# Patient Record
Sex: FEMALE | Race: WHITE | NOT HISPANIC OR LATINO | Employment: OTHER | URBAN - METROPOLITAN AREA
[De-identification: names, ages, dates, MRNs, and addresses within clinical notes are randomized per-mention and may not be internally consistent; named-entity substitution may affect disease eponyms.]

---

## 2017-01-19 ENCOUNTER — APPOINTMENT (OUTPATIENT)
Dept: LAB | Facility: HOSPITAL | Age: 82
End: 2017-01-19
Payer: MEDICARE

## 2017-01-19 ENCOUNTER — ALLSCRIPTS OFFICE VISIT (OUTPATIENT)
Dept: FAMILY MEDICINE CLINIC | Facility: CLINIC | Age: 82
End: 2017-01-19
Payer: MEDICARE

## 2017-01-19 DIAGNOSIS — I10 ESSENTIAL (PRIMARY) HYPERTENSION: ICD-10-CM

## 2017-01-19 LAB
ALBUMIN SERPL BCP-MCNC: 3.9 G/DL (ref 3.5–5)
ALP SERPL-CCNC: 69 U/L (ref 46–116)
ALT SERPL W P-5'-P-CCNC: 24 U/L (ref 12–78)
ANION GAP SERPL CALCULATED.3IONS-SCNC: 8 MMOL/L (ref 4–13)
AST SERPL W P-5'-P-CCNC: 28 U/L (ref 5–45)
BACTERIA UR QL AUTO: NORMAL /HPF
BASOPHILS # BLD AUTO: 0.03 THOUSANDS/ΜL (ref 0–0.1)
BASOPHILS NFR BLD AUTO: 1 % (ref 0–1)
BILIRUB SERPL-MCNC: 0.48 MG/DL (ref 0.2–1)
BILIRUB UR QL STRIP: ABNORMAL
BUN SERPL-MCNC: 20 MG/DL (ref 5–25)
CALCIUM SERPL-MCNC: 9.3 MG/DL (ref 8.3–10.1)
CHLORIDE SERPL-SCNC: 104 MMOL/L (ref 100–108)
CHOLEST SERPL-MCNC: 197 MG/DL (ref 50–200)
CLARITY UR: CLEAR
CO2 SERPL-SCNC: 28 MMOL/L (ref 21–32)
COLOR UR: ABNORMAL
CREAT SERPL-MCNC: 0.89 MG/DL (ref 0.6–1.3)
EOSINOPHIL # BLD AUTO: 0.5 THOUSAND/ΜL (ref 0–0.61)
EOSINOPHIL NFR BLD AUTO: 9 % (ref 0–6)
ERYTHROCYTE [DISTWIDTH] IN BLOOD BY AUTOMATED COUNT: 13.2 % (ref 11.6–15.1)
GFR SERPL CREATININE-BSD FRML MDRD: >60 ML/MIN/1.73SQ M
GLUCOSE SERPL-MCNC: 92 MG/DL (ref 65–140)
GLUCOSE UR STRIP-MCNC: NEGATIVE MG/DL
HCT VFR BLD AUTO: 38.2 % (ref 34.8–46.1)
HDLC SERPL-MCNC: 72 MG/DL (ref 40–60)
HGB BLD-MCNC: 12.7 G/DL (ref 11.5–15.4)
HGB UR QL STRIP.AUTO: ABNORMAL
KETONES UR STRIP-MCNC: ABNORMAL MG/DL
LDLC SERPL CALC-MCNC: 112 MG/DL (ref 0–100)
LEUKOCYTE ESTERASE UR QL STRIP: NEGATIVE
LYMPHOCYTES # BLD AUTO: 1.36 THOUSANDS/ΜL (ref 0.6–4.47)
LYMPHOCYTES NFR BLD AUTO: 24 % (ref 14–44)
MCH RBC QN AUTO: 29.7 PG (ref 26.8–34.3)
MCHC RBC AUTO-ENTMCNC: 33.2 G/DL (ref 31.4–37.4)
MCV RBC AUTO: 89 FL (ref 82–98)
MONOCYTES # BLD AUTO: 0.4 THOUSAND/ΜL (ref 0.17–1.22)
MONOCYTES NFR BLD AUTO: 7 % (ref 4–12)
NEUTROPHILS # BLD AUTO: 3.27 THOUSANDS/ΜL (ref 1.85–7.62)
NEUTS SEG NFR BLD AUTO: 59 % (ref 43–75)
NITRITE UR QL STRIP: NEGATIVE
NON-SQ EPI CELLS URNS QL MICRO: NORMAL /HPF
NRBC BLD AUTO-RTO: 0 /100 WBCS
PH UR STRIP.AUTO: 6.5 [PH] (ref 4.5–8)
PLATELET # BLD AUTO: 262 THOUSANDS/UL (ref 149–390)
PMV BLD AUTO: 10.6 FL (ref 8.9–12.7)
POTASSIUM SERPL-SCNC: 3.7 MMOL/L (ref 3.5–5.3)
PROT SERPL-MCNC: 7.7 G/DL (ref 6.4–8.2)
PROT UR STRIP-MCNC: ABNORMAL MG/DL
RBC # BLD AUTO: 4.28 MILLION/UL (ref 3.81–5.12)
RBC #/AREA URNS AUTO: NORMAL /HPF
SODIUM SERPL-SCNC: 140 MMOL/L (ref 136–145)
SP GR UR STRIP.AUTO: 1.02 (ref 1–1.03)
T3 SERPL-MCNC: 0.9 NG/ML (ref 0.6–1.8)
T4 FREE SERPL-MCNC: 1.38 NG/DL (ref 0.76–1.46)
TRIGL SERPL-MCNC: 65 MG/DL
TSH SERPL DL<=0.05 MIU/L-ACNC: 0.93 UIU/ML (ref 0.36–3.74)
UROBILINOGEN UR QL STRIP.AUTO: 0.2 E.U./DL
WBC # BLD AUTO: 5.57 THOUSAND/UL (ref 4.31–10.16)
WBC #/AREA URNS AUTO: NORMAL /HPF

## 2017-01-19 PROCEDURE — 99213 OFFICE O/P EST LOW 20 MIN: CPT | Performed by: PHYSICIAN ASSISTANT

## 2017-01-19 PROCEDURE — 84480 ASSAY TRIIODOTHYRONINE (T3): CPT

## 2017-01-19 PROCEDURE — 84443 ASSAY THYROID STIM HORMONE: CPT

## 2017-01-19 PROCEDURE — 87086 URINE CULTURE/COLONY COUNT: CPT

## 2017-01-19 PROCEDURE — 85025 COMPLETE CBC W/AUTO DIFF WBC: CPT

## 2017-01-19 PROCEDURE — 81001 URINALYSIS AUTO W/SCOPE: CPT

## 2017-01-19 PROCEDURE — 80061 LIPID PANEL: CPT

## 2017-01-19 PROCEDURE — 36415 COLL VENOUS BLD VENIPUNCTURE: CPT

## 2017-01-19 PROCEDURE — 84439 ASSAY OF FREE THYROXINE: CPT

## 2017-01-19 PROCEDURE — 80053 COMPREHEN METABOLIC PANEL: CPT

## 2017-01-20 LAB — BACTERIA UR CULT: NORMAL

## 2017-03-04 ENCOUNTER — APPOINTMENT (EMERGENCY)
Dept: RADIOLOGY | Facility: HOSPITAL | Age: 82
End: 2017-03-04
Payer: MEDICARE

## 2017-03-04 ENCOUNTER — HOSPITAL ENCOUNTER (EMERGENCY)
Facility: HOSPITAL | Age: 82
Discharge: HOME | End: 2017-03-04
Payer: MEDICARE

## 2017-03-04 ENCOUNTER — APPOINTMENT (EMERGENCY)
Dept: CT IMAGING | Facility: HOSPITAL | Age: 82
End: 2017-03-04
Payer: MEDICARE

## 2017-03-04 VITALS
HEART RATE: 83 BPM | TEMPERATURE: 98.3 F | WEIGHT: 99 LBS | HEIGHT: 55 IN | BODY MASS INDEX: 22.91 KG/M2 | OXYGEN SATURATION: 98 % | DIASTOLIC BLOOD PRESSURE: 79 MMHG | SYSTOLIC BLOOD PRESSURE: 117 MMHG | RESPIRATION RATE: 16 BRPM

## 2017-03-04 DIAGNOSIS — E86.0 DEHYDRATION: ICD-10-CM

## 2017-03-04 DIAGNOSIS — R55 NEAR SYNCOPE: Primary | ICD-10-CM

## 2017-03-04 LAB
ALBUMIN SERPL BCP-MCNC: 3.6 G/DL (ref 3.5–5)
ALP SERPL-CCNC: 78 U/L (ref 46–116)
ALT SERPL W P-5'-P-CCNC: 24 U/L (ref 12–78)
ANION GAP SERPL CALCULATED.3IONS-SCNC: 13 MMOL/L (ref 4–13)
AST SERPL W P-5'-P-CCNC: 24 U/L (ref 5–45)
BACTERIA UR QL AUTO: ABNORMAL /HPF
BASOPHILS # BLD AUTO: 0.02 THOUSANDS/ΜL (ref 0–0.1)
BASOPHILS NFR BLD AUTO: 0 % (ref 0–1)
BILIRUB SERPL-MCNC: 0.5 MG/DL (ref 0.2–1)
BILIRUB UR QL STRIP: NEGATIVE
BUN SERPL-MCNC: 25 MG/DL (ref 5–25)
CALCIUM SERPL-MCNC: 9.4 MG/DL (ref 8.3–10.1)
CHLORIDE SERPL-SCNC: 95 MMOL/L (ref 100–108)
CLARITY UR: CLEAR
CO2 SERPL-SCNC: 26 MMOL/L (ref 21–32)
COLOR UR: ABNORMAL
CREAT SERPL-MCNC: 1.4 MG/DL (ref 0.6–1.3)
EOSINOPHIL # BLD AUTO: 0.18 THOUSAND/ΜL (ref 0–0.61)
EOSINOPHIL NFR BLD AUTO: 1 % (ref 0–6)
ERYTHROCYTE [DISTWIDTH] IN BLOOD BY AUTOMATED COUNT: 11.8 % (ref 11.6–15.1)
GFR SERPL CREATININE-BSD FRML MDRD: 35.8 ML/MIN/1.73SQ M
GLUCOSE SERPL-MCNC: 120 MG/DL (ref 65–140)
GLUCOSE UR STRIP-MCNC: NEGATIVE MG/DL
HCT VFR BLD AUTO: 39.1 % (ref 34.8–46.1)
HGB BLD-MCNC: 12.8 G/DL (ref 11.5–15.4)
HGB UR QL STRIP.AUTO: ABNORMAL
HOLD SPECIMEN: NORMAL
KETONES UR STRIP-MCNC: NEGATIVE MG/DL
LEUKOCYTE ESTERASE UR QL STRIP: NEGATIVE
LYMPHOCYTES # BLD AUTO: 2.09 THOUSANDS/ΜL (ref 0.6–4.47)
LYMPHOCYTES NFR BLD AUTO: 16 % (ref 14–44)
MCH RBC QN AUTO: 29.2 PG (ref 26.8–34.3)
MCHC RBC AUTO-ENTMCNC: 32.7 G/DL (ref 31.4–37.4)
MCV RBC AUTO: 89 FL (ref 82–98)
MONOCYTES # BLD AUTO: 1.52 THOUSAND/ΜL (ref 0.17–1.22)
MONOCYTES NFR BLD AUTO: 11 % (ref 4–12)
NEUTROPHILS # BLD AUTO: 9.57 THOUSANDS/ΜL (ref 1.85–7.62)
NEUTS SEG NFR BLD AUTO: 72 % (ref 43–75)
NITRITE UR QL STRIP: NEGATIVE
NON-SQ EPI CELLS URNS QL MICRO: ABNORMAL /HPF
PH UR STRIP.AUTO: 7 [PH] (ref 4.5–8)
PLATELET # BLD AUTO: 344 THOUSANDS/UL (ref 149–390)
PMV BLD AUTO: 9.7 FL (ref 8.9–12.7)
POTASSIUM SERPL-SCNC: 3.6 MMOL/L (ref 3.5–5.3)
PROT SERPL-MCNC: 8.4 G/DL (ref 6.4–8.2)
PROT UR STRIP-MCNC: NEGATIVE MG/DL
RBC # BLD AUTO: 4.39 MILLION/UL (ref 3.81–5.12)
RBC #/AREA URNS AUTO: ABNORMAL /HPF
SODIUM SERPL-SCNC: 134 MMOL/L (ref 136–145)
SP GR UR STRIP.AUTO: 1.01 (ref 1–1.03)
TROPONIN I SERPL-MCNC: <0.02 NG/ML
UROBILINOGEN UR QL STRIP.AUTO: 0.2 E.U./DL
WBC # BLD AUTO: 13.38 THOUSAND/UL (ref 4.31–10.16)
WBC #/AREA URNS AUTO: ABNORMAL /HPF

## 2017-03-04 PROCEDURE — 85025 COMPLETE CBC W/AUTO DIFF WBC: CPT | Performed by: PHYSICIAN ASSISTANT

## 2017-03-04 PROCEDURE — 81001 URINALYSIS AUTO W/SCOPE: CPT | Performed by: PHYSICIAN ASSISTANT

## 2017-03-04 PROCEDURE — 71020 HB CHEST X-RAY 2VW FRONTAL&LATL: CPT

## 2017-03-04 PROCEDURE — 70450 CT HEAD/BRAIN W/O DYE: CPT

## 2017-03-04 PROCEDURE — 87077 CULTURE AEROBIC IDENTIFY: CPT | Performed by: PHYSICIAN ASSISTANT

## 2017-03-04 PROCEDURE — 80053 COMPREHEN METABOLIC PANEL: CPT | Performed by: PHYSICIAN ASSISTANT

## 2017-03-04 PROCEDURE — 93005 ELECTROCARDIOGRAM TRACING: CPT | Performed by: PHYSICIAN ASSISTANT

## 2017-03-04 PROCEDURE — 96361 HYDRATE IV INFUSION ADD-ON: CPT

## 2017-03-04 PROCEDURE — 96360 HYDRATION IV INFUSION INIT: CPT

## 2017-03-04 PROCEDURE — 87086 URINE CULTURE/COLONY COUNT: CPT | Performed by: PHYSICIAN ASSISTANT

## 2017-03-04 PROCEDURE — 99285 EMERGENCY DEPT VISIT HI MDM: CPT

## 2017-03-04 PROCEDURE — 87186 SC STD MICRODIL/AGAR DIL: CPT | Performed by: PHYSICIAN ASSISTANT

## 2017-03-04 PROCEDURE — 36415 COLL VENOUS BLD VENIPUNCTURE: CPT | Performed by: PHYSICIAN ASSISTANT

## 2017-03-04 PROCEDURE — 84484 ASSAY OF TROPONIN QUANT: CPT | Performed by: PHYSICIAN ASSISTANT

## 2017-03-04 RX ORDER — HYDROXYZINE HYDROCHLORIDE 10 MG/1
10 TABLET, FILM COATED ORAL 3 TIMES DAILY PRN
COMMUNITY
End: 2018-03-12

## 2017-03-04 RX ADMIN — SODIUM CHLORIDE 500 ML: 0.9 INJECTION, SOLUTION INTRAVENOUS at 12:44

## 2017-03-06 LAB
ATRIAL RATE: 90 BPM
BACTERIA UR CULT: NORMAL
P AXIS: 37 DEGREES
PR INTERVAL: 132 MS
QRS AXIS: -7 DEGREES
QRSD INTERVAL: 66 MS
QT INTERVAL: 354 MS
QTC INTERVAL: 433 MS
T WAVE AXIS: 49 DEGREES
VENTRICULAR RATE: 90 BPM

## 2017-03-22 ENCOUNTER — ALLSCRIPTS OFFICE VISIT (OUTPATIENT)
Dept: FAMILY MEDICINE CLINIC | Facility: CLINIC | Age: 82
End: 2017-03-22
Payer: MEDICARE

## 2017-03-22 DIAGNOSIS — R53.83 OTHER FATIGUE: ICD-10-CM

## 2017-03-22 DIAGNOSIS — R35.0 FREQUENCY OF MICTURITION: ICD-10-CM

## 2017-03-22 PROCEDURE — 99213 OFFICE O/P EST LOW 20 MIN: CPT | Performed by: PHYSICIAN ASSISTANT

## 2017-03-23 ENCOUNTER — APPOINTMENT (OUTPATIENT)
Dept: LAB | Facility: HOSPITAL | Age: 82
End: 2017-03-23
Payer: MEDICARE

## 2017-03-23 DIAGNOSIS — R35.0 FREQUENCY OF MICTURITION: ICD-10-CM

## 2017-03-23 DIAGNOSIS — R53.83 OTHER FATIGUE: ICD-10-CM

## 2017-03-23 LAB
BACTERIA UR QL AUTO: ABNORMAL /HPF
BASOPHILS # BLD AUTO: 0.01 THOUSANDS/ΜL (ref 0–0.1)
BASOPHILS NFR BLD AUTO: 0 % (ref 0–1)
BILIRUB UR QL STRIP: NEGATIVE
CLARITY UR: ABNORMAL
COLOR UR: YELLOW
EOSINOPHIL # BLD AUTO: 0.32 THOUSAND/ΜL (ref 0–0.61)
EOSINOPHIL NFR BLD AUTO: 5 % (ref 0–6)
ERYTHROCYTE [DISTWIDTH] IN BLOOD BY AUTOMATED COUNT: 12.6 % (ref 11.6–15.1)
GLUCOSE UR STRIP-MCNC: NEGATIVE MG/DL
HCT VFR BLD AUTO: 35 % (ref 34.8–46.1)
HGB BLD-MCNC: 11.5 G/DL (ref 11.5–15.4)
HGB UR QL STRIP.AUTO: NEGATIVE
HYALINE CASTS #/AREA URNS LPF: ABNORMAL /LPF
KETONES UR STRIP-MCNC: ABNORMAL MG/DL
LEUKOCYTE ESTERASE UR QL STRIP: ABNORMAL
LYMPHOCYTES # BLD AUTO: 1.34 THOUSANDS/ΜL (ref 0.6–4.47)
LYMPHOCYTES NFR BLD AUTO: 21 % (ref 14–44)
MCH RBC QN AUTO: 29 PG (ref 26.8–34.3)
MCHC RBC AUTO-ENTMCNC: 32.9 G/DL (ref 31.4–37.4)
MCV RBC AUTO: 88 FL (ref 82–98)
MONOCYTES # BLD AUTO: 0.49 THOUSAND/ΜL (ref 0.17–1.22)
MONOCYTES NFR BLD AUTO: 8 % (ref 4–12)
NEUTROPHILS # BLD AUTO: 4.3 THOUSANDS/ΜL (ref 1.85–7.62)
NEUTS SEG NFR BLD AUTO: 66 % (ref 43–75)
NITRITE UR QL STRIP: NEGATIVE
NON-SQ EPI CELLS URNS QL MICRO: ABNORMAL /HPF
NRBC BLD AUTO-RTO: 0 /100 WBCS
PH UR STRIP.AUTO: 8 [PH] (ref 4.5–8)
PLATELET # BLD AUTO: 281 THOUSANDS/UL (ref 149–390)
PMV BLD AUTO: 11 FL (ref 8.9–12.7)
PROT UR STRIP-MCNC: ABNORMAL MG/DL
RBC # BLD AUTO: 3.96 MILLION/UL (ref 3.81–5.12)
RBC #/AREA URNS AUTO: ABNORMAL /HPF
SP GR UR STRIP.AUTO: 1.02 (ref 1–1.03)
UROBILINOGEN UR QL STRIP.AUTO: 1 E.U./DL
WBC # BLD AUTO: 6.47 THOUSAND/UL (ref 4.31–10.16)
WBC #/AREA URNS AUTO: ABNORMAL /HPF

## 2017-03-23 PROCEDURE — 87086 URINE CULTURE/COLONY COUNT: CPT

## 2017-03-23 PROCEDURE — 85025 COMPLETE CBC W/AUTO DIFF WBC: CPT

## 2017-03-23 PROCEDURE — 81001 URINALYSIS AUTO W/SCOPE: CPT

## 2017-03-23 PROCEDURE — 36415 COLL VENOUS BLD VENIPUNCTURE: CPT

## 2017-03-24 LAB — BACTERIA UR CULT: NORMAL

## 2017-04-24 ENCOUNTER — GENERIC CONVERSION - ENCOUNTER (OUTPATIENT)
Dept: OTHER | Facility: OTHER | Age: 82
End: 2017-04-24

## 2017-04-24 ENCOUNTER — TRANSCRIBE ORDERS (OUTPATIENT)
Dept: ADMINISTRATIVE | Facility: HOSPITAL | Age: 82
End: 2017-04-24

## 2017-04-24 DIAGNOSIS — E04.0 GOITER, SPECIFIED AS SIMPLE: Primary | ICD-10-CM

## 2017-04-26 ENCOUNTER — ALLSCRIPTS OFFICE VISIT (OUTPATIENT)
Dept: FAMILY MEDICINE CLINIC | Facility: CLINIC | Age: 82
End: 2017-04-26
Payer: MEDICARE

## 2017-04-26 DIAGNOSIS — D64.9 ANEMIA: ICD-10-CM

## 2017-04-26 PROCEDURE — 99213 OFFICE O/P EST LOW 20 MIN: CPT | Performed by: FAMILY MEDICINE

## 2017-04-28 ENCOUNTER — TRANSCRIBE ORDERS (OUTPATIENT)
Dept: ADMINISTRATIVE | Facility: HOSPITAL | Age: 82
End: 2017-04-28

## 2017-04-28 ENCOUNTER — APPOINTMENT (OUTPATIENT)
Dept: LAB | Facility: HOSPITAL | Age: 82
End: 2017-04-28
Payer: MEDICARE

## 2017-04-28 ENCOUNTER — HOSPITAL ENCOUNTER (OUTPATIENT)
Dept: ULTRASOUND IMAGING | Facility: HOSPITAL | Age: 82
Discharge: HOME | End: 2017-04-28
Payer: MEDICARE

## 2017-04-28 DIAGNOSIS — D64.9 ANEMIA: ICD-10-CM

## 2017-04-28 DIAGNOSIS — E04.0 GOITER, SPECIFIED AS SIMPLE: ICD-10-CM

## 2017-04-28 LAB
BASOPHILS # BLD AUTO: 0.02 THOUSANDS/ΜL (ref 0–0.1)
BASOPHILS NFR BLD AUTO: 0 % (ref 0–1)
EOSINOPHIL # BLD AUTO: 0.28 THOUSAND/ΜL (ref 0–0.61)
EOSINOPHIL NFR BLD AUTO: 5 % (ref 0–6)
ERYTHROCYTE [DISTWIDTH] IN BLOOD BY AUTOMATED COUNT: 13 % (ref 11.6–15.1)
HCT VFR BLD AUTO: 34.1 % (ref 34.8–46.1)
HGB BLD-MCNC: 11.1 G/DL (ref 11.5–15.4)
LYMPHOCYTES # BLD AUTO: 1.62 THOUSANDS/ΜL (ref 0.6–4.47)
LYMPHOCYTES NFR BLD AUTO: 27 % (ref 14–44)
MCH RBC QN AUTO: 29.6 PG (ref 26.8–34.3)
MCHC RBC AUTO-ENTMCNC: 32.6 G/DL (ref 31.4–37.4)
MCV RBC AUTO: 91 FL (ref 82–98)
MONOCYTES # BLD AUTO: 0.49 THOUSAND/ΜL (ref 0.17–1.22)
MONOCYTES NFR BLD AUTO: 8 % (ref 4–12)
NEUTROPHILS # BLD AUTO: 3.6 THOUSANDS/ΜL (ref 1.85–7.62)
NEUTS SEG NFR BLD AUTO: 60 % (ref 43–75)
PLATELET # BLD AUTO: 246 THOUSANDS/UL (ref 149–390)
PMV BLD AUTO: 9.5 FL (ref 8.9–12.7)
RBC # BLD AUTO: 3.75 MILLION/UL (ref 3.81–5.12)
WBC # BLD AUTO: 6.01 THOUSAND/UL (ref 4.31–10.16)

## 2017-04-28 PROCEDURE — 36415 COLL VENOUS BLD VENIPUNCTURE: CPT

## 2017-04-28 PROCEDURE — 85025 COMPLETE CBC W/AUTO DIFF WBC: CPT

## 2017-04-28 PROCEDURE — 76536 US EXAM OF HEAD AND NECK: CPT

## 2017-05-04 ENCOUNTER — GENERIC CONVERSION - ENCOUNTER (OUTPATIENT)
Dept: OTHER | Facility: OTHER | Age: 82
End: 2017-05-04

## 2017-06-22 ENCOUNTER — GENERIC CONVERSION - ENCOUNTER (OUTPATIENT)
Dept: OTHER | Facility: OTHER | Age: 82
End: 2017-06-22

## 2017-06-22 ENCOUNTER — TRANSCRIBE ORDERS (OUTPATIENT)
Dept: LAB | Facility: MEDICAL CENTER | Age: 82
End: 2017-06-22

## 2017-06-22 ENCOUNTER — APPOINTMENT (OUTPATIENT)
Dept: FAMILY MEDICINE CLINIC | Facility: CLINIC | Age: 82
End: 2017-06-22
Payer: MEDICARE

## 2017-06-22 ENCOUNTER — APPOINTMENT (OUTPATIENT)
Dept: LAB | Facility: MEDICAL CENTER | Age: 82
End: 2017-06-22
Payer: MEDICARE

## 2017-06-22 DIAGNOSIS — C34.90 MALIGNANT NEOPLASM OF UNSPECIFIED PART OF UNSPECIFIED BRONCHUS OR LUNG (HCC): ICD-10-CM

## 2017-06-22 DIAGNOSIS — R53.83 OTHER FATIGUE: ICD-10-CM

## 2017-06-22 LAB
ALBUMIN SERPL BCP-MCNC: 3.9 G/DL (ref 3.5–5)
ALP SERPL-CCNC: 59 U/L (ref 46–116)
ALT SERPL W P-5'-P-CCNC: 25 U/L (ref 12–78)
ANION GAP SERPL CALCULATED.3IONS-SCNC: 8 MMOL/L (ref 4–13)
AST SERPL W P-5'-P-CCNC: 29 U/L (ref 5–45)
BASOPHILS # BLD AUTO: 0.01 THOUSANDS/ΜL (ref 0–0.1)
BASOPHILS NFR BLD AUTO: 0 % (ref 0–1)
BILIRUB SERPL-MCNC: 0.4 MG/DL (ref 0.2–1)
BILIRUB UR QL STRIP: NEGATIVE
BUN SERPL-MCNC: 29 MG/DL (ref 5–25)
CALCIUM SERPL-MCNC: 8.9 MG/DL (ref 8.3–10.1)
CHLORIDE SERPL-SCNC: 98 MMOL/L (ref 100–108)
CLARITY UR: CLEAR
CO2 SERPL-SCNC: 26 MMOL/L (ref 21–32)
COLOR UR: YELLOW
CREAT SERPL-MCNC: 1.22 MG/DL (ref 0.6–1.3)
EOSINOPHIL # BLD AUTO: 0.22 THOUSAND/ΜL (ref 0–0.61)
EOSINOPHIL NFR BLD AUTO: 4 % (ref 0–6)
ERYTHROCYTE [DISTWIDTH] IN BLOOD BY AUTOMATED COUNT: 12.8 % (ref 11.6–15.1)
GFR SERPL CREATININE-BSD FRML MDRD: 42 ML/MIN/1.73SQ M
GLUCOSE SERPL-MCNC: 96 MG/DL (ref 65–140)
GLUCOSE UR STRIP-MCNC: NEGATIVE MG/DL
HCT VFR BLD AUTO: 33.1 % (ref 34.8–46.1)
HGB BLD-MCNC: 11.4 G/DL (ref 11.5–15.4)
HGB UR QL STRIP.AUTO: NEGATIVE
IRON SERPL-MCNC: 119 UG/DL (ref 50–170)
KETONES UR STRIP-MCNC: NEGATIVE MG/DL
LEUKOCYTE ESTERASE UR QL STRIP: NEGATIVE
LYMPHOCYTES # BLD AUTO: 1.28 THOUSANDS/ΜL (ref 0.6–4.47)
LYMPHOCYTES NFR BLD AUTO: 23 % (ref 14–44)
MCH RBC QN AUTO: 30.2 PG (ref 26.8–34.3)
MCHC RBC AUTO-ENTMCNC: 34.4 G/DL (ref 31.4–37.4)
MCV RBC AUTO: 88 FL (ref 82–98)
MONOCYTES # BLD AUTO: 0.53 THOUSAND/ΜL (ref 0.17–1.22)
MONOCYTES NFR BLD AUTO: 9 % (ref 4–12)
NEUTROPHILS # BLD AUTO: 3.57 THOUSANDS/ΜL (ref 1.85–7.62)
NEUTS SEG NFR BLD AUTO: 64 % (ref 43–75)
NITRITE UR QL STRIP: NEGATIVE
NRBC BLD AUTO-RTO: 0 /100 WBCS
PH UR STRIP.AUTO: 6 [PH] (ref 4.5–8)
PLATELET # BLD AUTO: 266 THOUSANDS/UL (ref 149–390)
PMV BLD AUTO: 10.2 FL (ref 8.9–12.7)
POTASSIUM SERPL-SCNC: 4.4 MMOL/L (ref 3.5–5.3)
PROT SERPL-MCNC: 7.7 G/DL (ref 6.4–8.2)
PROT UR STRIP-MCNC: NEGATIVE MG/DL
RBC # BLD AUTO: 3.78 MILLION/UL (ref 3.81–5.12)
SODIUM SERPL-SCNC: 132 MMOL/L (ref 136–145)
SP GR UR STRIP.AUTO: 1.02 (ref 1–1.03)
UROBILINOGEN UR QL STRIP.AUTO: 0.2 E.U./DL
WBC # BLD AUTO: 5.62 THOUSAND/UL (ref 4.31–10.16)

## 2017-06-22 PROCEDURE — 36415 COLL VENOUS BLD VENIPUNCTURE: CPT

## 2017-06-22 PROCEDURE — 83540 ASSAY OF IRON: CPT

## 2017-06-22 PROCEDURE — 99213 OFFICE O/P EST LOW 20 MIN: CPT | Performed by: PHYSICIAN ASSISTANT

## 2017-06-22 PROCEDURE — 85025 COMPLETE CBC W/AUTO DIFF WBC: CPT

## 2017-06-22 PROCEDURE — 81003 URINALYSIS AUTO W/O SCOPE: CPT

## 2017-06-22 PROCEDURE — 80053 COMPREHEN METABOLIC PANEL: CPT

## 2017-07-06 ENCOUNTER — APPOINTMENT (OUTPATIENT)
Dept: FAMILY MEDICINE CLINIC | Facility: CLINIC | Age: 82
End: 2017-07-06
Payer: MEDICARE

## 2017-07-06 ENCOUNTER — TRANSCRIBE ORDERS (OUTPATIENT)
Dept: RADIOLOGY | Facility: MEDICAL CENTER | Age: 82
End: 2017-07-06

## 2017-07-06 ENCOUNTER — APPOINTMENT (OUTPATIENT)
Dept: RADIOLOGY | Facility: MEDICAL CENTER | Age: 82
End: 2017-07-06
Payer: MEDICARE

## 2017-07-06 ENCOUNTER — GENERIC CONVERSION - ENCOUNTER (OUTPATIENT)
Dept: OTHER | Facility: OTHER | Age: 82
End: 2017-07-06

## 2017-07-06 DIAGNOSIS — C34.90 MALIGNANT NEOPLASM OF UNSPECIFIED PART OF UNSPECIFIED BRONCHUS OR LUNG (HCC): ICD-10-CM

## 2017-07-06 PROCEDURE — 71020 HB CHEST X-RAY 2VW FRONTAL&LATL: CPT

## 2017-07-06 PROCEDURE — 99212 OFFICE O/P EST SF 10 MIN: CPT | Performed by: PHYSICIAN ASSISTANT

## 2017-08-22 ENCOUNTER — APPOINTMENT (OUTPATIENT)
Dept: FAMILY MEDICINE CLINIC | Facility: CLINIC | Age: 82
End: 2017-08-22
Payer: MEDICARE

## 2017-08-22 ENCOUNTER — GENERIC CONVERSION - ENCOUNTER (OUTPATIENT)
Dept: OTHER | Facility: OTHER | Age: 82
End: 2017-08-22

## 2017-08-22 PROCEDURE — 99212 OFFICE O/P EST SF 10 MIN: CPT | Performed by: FAMILY MEDICINE

## 2017-12-06 ENCOUNTER — GENERIC CONVERSION - ENCOUNTER (OUTPATIENT)
Dept: OTHER | Facility: OTHER | Age: 82
End: 2017-12-06

## 2017-12-06 DIAGNOSIS — C34.90 MALIGNANT NEOPLASM OF UNSPECIFIED PART OF UNSPECIFIED BRONCHUS OR LUNG (HCC): ICD-10-CM

## 2017-12-06 DIAGNOSIS — I10 ESSENTIAL (PRIMARY) HYPERTENSION: ICD-10-CM

## 2017-12-08 ENCOUNTER — ALLSCRIPTS OFFICE VISIT (OUTPATIENT)
Dept: FAMILY MEDICINE CLINIC | Facility: CLINIC | Age: 82
End: 2017-12-08
Payer: MEDICARE

## 2017-12-08 PROCEDURE — 99213 OFFICE O/P EST LOW 20 MIN: CPT | Performed by: FAMILY MEDICINE

## 2017-12-11 NOTE — PROGRESS NOTES
Assessment    1  Hypertension (401 9) (I10)    Plan  Hypertension    · (1) CBC/PLT/DIFF; Status:Active; Requested for:50Xta1447;    · (1) COMPREHENSIVE METABOLIC PANEL; Status:Active; Requested for:94Fno1756;    · (1) TSH WITH FT4 REFLEX; Status:Active; Requested for:23Vkm8625;   PMH: History of small bowel obstruction    · Follow-up visit in 3 months Evaluation and Treatment  Follow-up  Status: Hold For -Scheduling  Requested for: 91XWL8186    Discussion/Summary    Continue current meds  Cardiology is handling her Lisinopril 3 months or sooner if needed  The treatment plan was reviewed with the patient/guardian  The patient/guardian understands and agrees with the treatment plan      Chief Complaint  Patient is here for regular check up  History of Present Illness  81 yo female presents for follow up  She is doing well did see Pulmonary on the 6th of December and will be scheduled for ct scan of the chest       Review of Systems   Constitutional: No fever, no chills, feels well, no tiredness, no recent weight gain or weight loss  Eyes: No complaints of eye pain, no red eyes, no eyesight problems, no discharge, no dry eyes, no itching of eyes  ENT: no complaints of earache, no loss of hearing, no nose bleeds, no nasal discharge, no sore throat, no hoarseness  Cardiovascular: No complaints of slow heart rate, no fast heart rate, no chest pain, no palpitations, no leg claudication, no lower extremity edema  Respiratory: as noted in HPI  Active Problems  1  Adenocarcinoma of lung (162 9) (C34 90)   2  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)   3  Back pain (724 5) (M54 9)   4  Dental disorder (525 9) (K08 9)   5  Hypercholesteremia (272 0) (E78 00)   6  Hypertension (401 9) (I10)   7  Hyponatremia (276 1) (E87 1)   8  Joint pain, hip (719 45) (M25 559)   9  Low hemoglobin (285 9) (D64 9)   10  Menopausal problem (627 9) (N95 9)   11  Mental status change (780 97) (R41 82)   12   Need for influenza vaccination (V04 81) (Z23)   13  Need for pneumococcal vaccination (V03 82) (Z23)   14  Osteoporosis screening (V82 81) (Z13 820)   15  Screening for condition (V82 9) (Z13 9)   16  Thyroid nodule (241 0) (E04 1)   17  Visit for screening mammogram (V76 12) (Z12 31)    Past Medical History    1  History of Acute pain (338 19) (R52)   2  History of Acute sinusitis (461 9) (J01 90)   3  History of Encounter for screening mammogram for malignant neoplasm of breast (V76 12) (Z12 31)   4  History of cough   5  History of dermatitis (V13 3) (Z87 2)   6  History of dysuria (V13 00) (Z87 898)   7  History of fatigue (V13 89) (Z87 898)   8  History of hypertension (V12 59) (Z86 79)   9  History of idiopathic urticaria (V13 3) (Z87 2)   10  History of itching (V13 3) (Z87 898)   11  History of small bowel obstruction (V12 79) (Z87 19)   12  History of urinary frequency (V13 09) (Y99 932)    The active problems and past medical history were reviewed and updated today  Surgical History  1  History of Cataract Surgery   2  History of Lung Lobectomy    The surgical history was reviewed and updated today  Family History  Mother    1  No pertinent family history    The family history was reviewed and updated today  Social History     · Denied: Alcohol use   · Denied: Drug use (305 90) (F19 90)   · Never a smoker   · Retired  The social history was reviewed and updated today  The social history was reviewed and is unchanged  Current Meds   1  Advil 200 MG Oral Capsule; Therapy: 46ARN0650 to Recorded   2  HydrOXYzine HCl - 10 MG Oral Tablet; take 1 tab 2x daily as needed; Therapy: 08Zuk5403 to (Last Guicho Shan)  Requested for: 88Soj6847 Ordered   3  Lisinopril-Hydrochlorothiazide 20-12 5 MG Oral Tablet; TAKE 1 TABLET DAILY; Therapy: 18Hnf3145 to (Evaluate:24Jan2017)  Requested for: 30OLR0022; Last Rx:96Qwy9617 Ordered   4  Vitamin C 250 MG Oral Tablet; TAKE 1 TABLET DAILY;  Therapy: 94LVW6770 to (Evaluate:01Jun2017) Requested for: 21LUE5392; Last Rx:96Vrq5675 Ordered    Allergies  1  No Known Drug Allergies    Vitals  Vital Signs    Recorded: 93TUS2850 09:32AM   Temperature 98 6 F   Heart Rate 90   Systolic 727   Diastolic 70   Height 4 ft 8 in   Weight 92 lb    BMI Calculated 20 63   BSA Calculated 1 28   O2 Saturation 97       Physical Exam   Constitutional  General appearance: No acute distress, well appearing and well nourished  Eyes  Conjunctiva and lids: No swelling, erythema or discharge  Pupils and irises: Equal, round and reactive to light  Ears, Nose, Mouth, and Throat  External inspection of ears and nose: Normal    Pulmonary  Respiratory effort: No increased work of breathing or signs of respiratory distress  Auscultation of lungs: Clear to auscultation  Cardiovascular  Palpation of heart: Normal PMI, no thrills  Auscultation of heart: Normal rate and rhythm, normal S1 and S2, without murmurs  Examination of extremities for edema and/or varicosities: Normal    Abdomen  Abdomen: Non-tender, no masses  Liver and spleen: No hepatomegaly or splenomegaly  Lymphatic  Palpation of lymph nodes in neck: No lymphadenopathy  Skin  Skin and subcutaneous tissue: Normal without rashes or lesions  Neurologic  Cranial nerves: Cranial nerves 2-12 intact  Psychiatric  Orientation to person, place, and time: Normal    Mood and affect: Normal          Future Appointments    Date/Time Provider Specialty Site   12/29/2017 10:30 AM REBECCA Zamudio  Thoracic Surgery [facility]   03/08/2018 09:15 AM Lilli Bliss, [address] [place] [address]       Signatures   Electronically signed by :  Hannah Mustafa [facility]; Dec  8 2017  9:49AM EST                       (Author)    Electronically signed by : Corby Hwang MD; Dec 10 2017  4:47PM EST                       (Author)

## 2018-01-09 NOTE — RESULT NOTES
Verified Results  (1) COMPREHENSIVE METABOLIC PANEL 21NMR9241 55:06WB Ruth Flores Order Number: [id number]     Test Name Result Flag Reference   GLUCOSE,RANDM 85 mg/dL     If the patient is fasting, the ADA then defines impaired fasting glucose as > 100 mg/dL and diabetes as > or equal to 123 mg/dL  SODIUM 135 mmol/L L 136-145   POTASSIUM 4 4 mmol/L  3 5-5 3   CHLORIDE 101 mmol/L  100-108   CARBON DIOXIDE 26 mmol/L  21-32   ANION GAP (CALC) 8 mmol/L  4-13   BLOOD UREA NITROGEN 24 mg/dL  5-25   CREATININE 0 87 mg/dL  0 60-1 30   Standardized to IDMS reference method   CALCIUM 10 0 mg/dL  8 3-10 1   BILI, TOTAL 0 55 mg/dL  0 20-1 00   ALK PHOSPHATAS 71 U/L     ALT (SGPT) 24 U/L  12-78   AST(SGOT) 35 U/L  5-45   ALBUMIN 4 4 g/dL  3 5-5 0   TOTAL PROTEIN 8 8 g/dL H 6 4-8 2   eGFR Non-African American      >60 0 ml/min/1 73sq m   - Patient Instructions: This bloodwork is non-fasting  Please drink two glasses of water morning of bloodwork  National Kidney Disease Education Program recommendations are as follows:  GFR calculation is accurate only with a steady state creatinine  Chronic Kidney disease less than 60 ml/min/1 73 sq  meters  Kidney failure less than 15 ml/min/1 73 sq  meters  (1) CBC/PLT/DIFF 36FCG4987 11:41AM Ruth Tanner Order Number: [id number]     Test Name Result Flag Reference   WBC COUNT 5 64 Thousand/uL  4 31-10 16   RBC COUNT 4 52 Million/uL  3 81-5 12   HEMOGLOBIN 13 3 g/dL  11 5-15 4   HEMATOCRIT 39 9 %  34 8-46  1   MCV 88 fL  82-98   MCH 29 4 pg  26 8-34 3   MCHC 33 3 g/dL  31 4-37 4   RDW 13 8 %  11 6-15 1   MPV 11 1 fL  8 9-12 7   PLATELET COUNT 791 Thousands/uL  149-390   nRBC AUTOMATED 0 /100 WBCs     NEUTROPHILS RELATIVE PERCENT 66 %  43-75   LYMPHOCYTES RELATIVE PERCENT 25 %  14-44   MONOCYTES RELATIVE PERCENT 8 %  4-12   EOSINOPHILS RELATIVE PERCENT 1 %  0-6   BASOPHILS RELATIVE PERCENT 0 %  0-1   NEUTROPHILS ABSOLUTE COUNT 3 70 Thousands/?L  1 85-7 62   LYMPHOCYTES ABSOLUTE COUNT 1 39 Thousands/?L  0 60-4 47   MONOCYTES ABSOLUTE COUNT 0 46 Thousand/?L  0 17-1 22   EOSINOPHILS ABSOLUTE COUNT 0 07 Thousand/?L  0 00-0 61   BASOPHILS ABSOLUTE COUNT 0 01 Thousands/?L  0 00-0 10   - Patient Instructions: This bloodwork is non-fasting  Please drink two glasses of water morning of bloodwork  - Patient Instructions: This bloodwork is non-fasting  Please drink two glasses of water morning of bloodwork  (1) TSH 65ZJC2479 11:41AM [facility] Number: [id number]     Test Name Result Flag Reference   TSH 0 674 uIU/mL  0 358-3 740   - Patient Instructions: This bloodwork is non-fasting  Please drink two glasses of water morning of bloodwork  - Patient Instructions: This bloodwork is non-fasting  Please drink two glasses of water morning of bloodwork  Patients undergoing fluorescein dye angiography may retain small amounts of fluorescein in the body for 48-72 hours post procedure  Samples containing fluorescein can produce falsely depressed TSH values  If the patient had this procedure,a specimen should be resubmitted post fluorescein clearance            The recommended reference ranges for TSH during pregnancy are as follows:  First trimester 0 1 to 2 5 uIU/mL  Second trimester  0 2 to 3 0 uIU/mL  Third trimester 0 3 to 3 0 uIU/m     (1) THYROID MICROSOMAL ANTIBODY 07Ugh1211 11:41AM [facility] Order Number: [id number]     Test Name Result Flag Reference   THY MICROSOM AB 9 IU/mL  0 - 34   Performed at:  [address] [place] [address]  [id number]  : Mika Kelley MD, Phone:  [phone]

## 2018-01-11 NOTE — RESULT NOTES
Verified Results  (1) URINALYSIS w URINE C/S REFLEX (will reflex a microscopy if leukocytes, occult blood, or nitrites are not within normal limits) 12Kyz3950 09:03AM [facility] Order Number: [id number]     Test Name Result Flag Reference   COLOR Yellow     CLARITY Clear     PH UA 7 0  4 5-8 0   LEUKOCYTE ESTERASE UA Negative  Negative   NITRITE UA Negative  Negative   PROTEIN UA 30 (1+) mg/dl A Negative   GLUCOSE UA Negative mg/dl  Negative   KETONES UA Negative mg/dl  Negative   UROBILINOGEN UA 0 2 E U /dl  0 2, 1 0 E U /dl   BILIRUBIN UA Negative  Negative   BLOOD UA Small A Negative   SPECIFIC GRAVITY UA 1 016  1 003-1 030   BACTERIA None Seen /hpf  None Seen, Occasional   EPITHELIAL CELLS None Seen /hpf  None Seen, Occasional   RBC UA 4-10 /hpf A None Seen   WBC UA None Seen /hpf  None Seen

## 2018-01-13 VITALS
OXYGEN SATURATION: 97 % | DIASTOLIC BLOOD PRESSURE: 80 MMHG | HEIGHT: 56 IN | BODY MASS INDEX: 20.92 KG/M2 | TEMPERATURE: 96.8 F | HEART RATE: 70 BPM | WEIGHT: 93 LBS | SYSTOLIC BLOOD PRESSURE: 152 MMHG

## 2018-01-13 VITALS
HEART RATE: 71 BPM | RESPIRATION RATE: 17 BRPM | WEIGHT: 92 LBS | DIASTOLIC BLOOD PRESSURE: 86 MMHG | SYSTOLIC BLOOD PRESSURE: 156 MMHG | HEIGHT: 56 IN | OXYGEN SATURATION: 98 % | BODY MASS INDEX: 20.7 KG/M2 | TEMPERATURE: 98.6 F

## 2018-01-15 VITALS
OXYGEN SATURATION: 98 % | DIASTOLIC BLOOD PRESSURE: 94 MMHG | HEART RATE: 68 BPM | RESPIRATION RATE: 17 BRPM | BODY MASS INDEX: 21.15 KG/M2 | SYSTOLIC BLOOD PRESSURE: 162 MMHG | WEIGHT: 94 LBS | HEIGHT: 56 IN | TEMPERATURE: 98.6 F

## 2018-01-22 VITALS
OXYGEN SATURATION: 97 % | DIASTOLIC BLOOD PRESSURE: 82 MMHG | HEIGHT: 56 IN | BODY MASS INDEX: 20.92 KG/M2 | SYSTOLIC BLOOD PRESSURE: 130 MMHG | WEIGHT: 93 LBS | HEART RATE: 99 BPM | TEMPERATURE: 98.6 F

## 2018-01-22 VITALS
WEIGHT: 94 LBS | HEIGHT: 56 IN | OXYGEN SATURATION: 98 % | TEMPERATURE: 97.6 F | BODY MASS INDEX: 21.15 KG/M2 | HEART RATE: 97 BPM | SYSTOLIC BLOOD PRESSURE: 132 MMHG | DIASTOLIC BLOOD PRESSURE: 84 MMHG

## 2018-01-22 VITALS
HEART RATE: 90 BPM | WEIGHT: 93 LBS | BODY MASS INDEX: 20.92 KG/M2 | TEMPERATURE: 97.6 F | OXYGEN SATURATION: 99 % | SYSTOLIC BLOOD PRESSURE: 142 MMHG | HEIGHT: 56 IN | DIASTOLIC BLOOD PRESSURE: 82 MMHG

## 2018-01-23 VITALS
HEIGHT: 56 IN | OXYGEN SATURATION: 97 % | HEART RATE: 90 BPM | TEMPERATURE: 98.6 F | WEIGHT: 92 LBS | BODY MASS INDEX: 20.7 KG/M2 | DIASTOLIC BLOOD PRESSURE: 70 MMHG | SYSTOLIC BLOOD PRESSURE: 124 MMHG

## 2018-01-24 VITALS
DIASTOLIC BLOOD PRESSURE: 60 MMHG | HEIGHT: 56 IN | OXYGEN SATURATION: 98 % | HEART RATE: 97 BPM | SYSTOLIC BLOOD PRESSURE: 118 MMHG

## 2018-02-12 DIAGNOSIS — M54.5 ACUTE LOW BACK PAIN, UNSPECIFIED BACK PAIN LATERALITY, WITH SCIATICA PRESENCE UNSPECIFIED: Primary | ICD-10-CM

## 2018-02-12 RX ORDER — ACETAMINOPHEN AND CODEINE PHOSPHATE 300; 30 MG/1; MG/1
1 TABLET ORAL EVERY 8 HOURS PRN
Qty: 30 TABLET | Refills: 0 | Status: SHIPPED | OUTPATIENT
Start: 2018-02-12 | End: 2018-03-12

## 2018-02-20 ENCOUNTER — TRANSCRIBE ORDERS (OUTPATIENT)
Dept: RADIOLOGY | Facility: MEDICAL CENTER | Age: 83
End: 2018-02-20

## 2018-02-20 ENCOUNTER — OFFICE VISIT (OUTPATIENT)
Dept: FAMILY MEDICINE CLINIC | Facility: CLINIC | Age: 83
End: 2018-02-20
Payer: MEDICARE

## 2018-02-20 VITALS
DIASTOLIC BLOOD PRESSURE: 60 MMHG | TEMPERATURE: 96.9 F | WEIGHT: 87 LBS | HEIGHT: 55 IN | HEART RATE: 83 BPM | RESPIRATION RATE: 18 BRPM | OXYGEN SATURATION: 96 % | SYSTOLIC BLOOD PRESSURE: 112 MMHG | BODY MASS INDEX: 20.13 KG/M2

## 2018-02-20 DIAGNOSIS — D50.9 IRON DEFICIENCY ANEMIA, UNSPECIFIED IRON DEFICIENCY ANEMIA TYPE: ICD-10-CM

## 2018-02-20 DIAGNOSIS — R53.82 CHRONIC FATIGUE: Primary | ICD-10-CM

## 2018-02-20 PROBLEM — D64.9 LOW HEMOGLOBIN: Status: ACTIVE | Noted: 2017-04-26

## 2018-02-20 LAB
ALBUMIN SERPL BCP-MCNC: 3.8 G/DL (ref 3.5–5)
ALP SERPL-CCNC: 58 U/L (ref 46–116)
ALT SERPL W P-5'-P-CCNC: 23 U/L (ref 12–78)
ANION GAP SERPL CALCULATED.3IONS-SCNC: 11 MMOL/L (ref 4–13)
AST SERPL W P-5'-P-CCNC: 30 U/L (ref 5–45)
BASOPHILS # BLD AUTO: 0.01 THOUSANDS/ΜL (ref 0–0.1)
BASOPHILS NFR BLD AUTO: 0 % (ref 0–1)
BILIRUB SERPL-MCNC: 0.47 MG/DL (ref 0.2–1)
BUN SERPL-MCNC: 54 MG/DL (ref 5–25)
CALCIUM SERPL-MCNC: 9.6 MG/DL (ref 8.3–10.1)
CHLORIDE SERPL-SCNC: 96 MMOL/L (ref 100–108)
CO2 SERPL-SCNC: 25 MMOL/L (ref 21–32)
CREAT SERPL-MCNC: 1.84 MG/DL (ref 0.6–1.3)
EOSINOPHIL # BLD AUTO: 0.03 THOUSAND/ΜL (ref 0–0.61)
EOSINOPHIL NFR BLD AUTO: 0 % (ref 0–6)
ERYTHROCYTE [DISTWIDTH] IN BLOOD BY AUTOMATED COUNT: 12.7 % (ref 11.6–15.1)
GFR SERPL CREATININE-BSD FRML MDRD: 25 ML/MIN/1.73SQ M
GLUCOSE P FAST SERPL-MCNC: 97 MG/DL (ref 65–99)
HCT VFR BLD AUTO: 31.6 % (ref 34.8–46.1)
HGB BLD-MCNC: 10.6 G/DL (ref 11.5–15.4)
IRON SERPL-MCNC: 48 UG/DL (ref 50–170)
LYMPHOCYTES # BLD AUTO: 1.1 THOUSANDS/ΜL (ref 0.6–4.47)
LYMPHOCYTES NFR BLD AUTO: 16 % (ref 14–44)
MCH RBC QN AUTO: 29 PG (ref 26.8–34.3)
MCHC RBC AUTO-ENTMCNC: 33.5 G/DL (ref 31.4–37.4)
MCV RBC AUTO: 87 FL (ref 82–98)
MONOCYTES # BLD AUTO: 0.45 THOUSAND/ΜL (ref 0.17–1.22)
MONOCYTES NFR BLD AUTO: 7 % (ref 4–12)
NEUTROPHILS # BLD AUTO: 5.3 THOUSANDS/ΜL (ref 1.85–7.62)
NEUTS SEG NFR BLD AUTO: 77 % (ref 43–75)
NRBC BLD AUTO-RTO: 0 /100 WBCS
PLATELET # BLD AUTO: 306 THOUSANDS/UL (ref 149–390)
PMV BLD AUTO: 10.1 FL (ref 8.9–12.7)
POTASSIUM SERPL-SCNC: 4.4 MMOL/L (ref 3.5–5.3)
PROT SERPL-MCNC: 8.5 G/DL (ref 6.4–8.2)
RBC # BLD AUTO: 3.65 MILLION/UL (ref 3.81–5.12)
SODIUM SERPL-SCNC: 132 MMOL/L (ref 136–145)
WBC # BLD AUTO: 6.9 THOUSAND/UL (ref 4.31–10.16)

## 2018-02-20 PROCEDURE — 85025 COMPLETE CBC W/AUTO DIFF WBC: CPT | Performed by: PHYSICIAN ASSISTANT

## 2018-02-20 PROCEDURE — 83540 ASSAY OF IRON: CPT | Performed by: PHYSICIAN ASSISTANT

## 2018-02-20 PROCEDURE — 99213 OFFICE O/P EST LOW 20 MIN: CPT | Performed by: FAMILY MEDICINE

## 2018-02-20 PROCEDURE — 80053 COMPREHEN METABOLIC PANEL: CPT | Performed by: PHYSICIAN ASSISTANT

## 2018-02-20 RX ORDER — ASCORBIC ACID 250 MG
1 TABLET,CHEWABLE ORAL DAILY
COMMUNITY
Start: 2017-05-02 | End: 2018-03-12

## 2018-02-20 RX ORDER — OMEGA-3 FATTY ACIDS/FISH OIL 300-1000MG
CAPSULE ORAL
COMMUNITY
Start: 2014-05-29 | End: 2018-02-20 | Stop reason: SDUPTHER

## 2018-02-20 RX ORDER — LISINOPRIL AND HYDROCHLOROTHIAZIDE 20; 12.5 MG/1; MG/1
1 TABLET ORAL DAILY
COMMUNITY
Start: 2015-09-17 | End: 2018-02-20 | Stop reason: SDUPTHER

## 2018-02-20 NOTE — PROGRESS NOTES
History and Physical  J Luis Gutierrez 80 y o  female MRN: [id number]      Assessment:   Fatigue    Plan:  Labs as ordered  RTC 3 months    Chief Complaint   Patient presents with    Follow-up        HPI:  Huma Alaniz is a 80 y o  female who presents for routine follow up  She continues to c/o fatigue  She reports she is going to see Dr Kira Masterson after this appt  No other complaints today  Historical Information   Past Medical History:   Diagnosis Date    Cancer ([address] )     lung    Hyperlipidemia     Hypertension     Hyponatremia      Past Surgical History:   Procedure Laterality Date     SECTION      CHEST WALL BIOPSY N/A 2016    Procedure: Uterine Biopsy;  Surgeon: Olivier Franks MD;  [facility];  Service:     LAPAROTOMY N/A 2016    Procedure: LAPAROTOMY EXPLORATORY, LYSIS OF Uterine ADHESIONS;  Surgeon: Florencia Mc DO;  [facility];  Service:      Social History   History   Alcohol Use No     History   Drug Use No     History   Smoking Status    Never Smoker   Smokeless Tobacco    Not on file     No family history on file  Meds/Allergies   No Known Allergies    Meds:    Current Outpatient Prescriptions:     Ascorbic Acid (VITAMIN C) 250 MG CHEW, Chew 1 tablet daily, Disp: , Rfl:     acetaminophen-codeine (TYLENOL #3) 300-30 mg per tablet, Take 1 tablet by mouth every 8 (eight) hours as needed for moderate pain, Disp: 30 tablet, Rfl: 0    hydrOXYzine HCL (ATARAX) 10 mg tablet, Take 10 mg by mouth 3 (three) times a day as needed for itching, Disp: , Rfl:     Ibuprofen (ADVIL PO), Take by mouth, Disp: , Rfl:     lisinopril 20 mg TABS 20 mg, hydrochlorothiazide 12 5 mg TABS 12 5 mg, Take 1 tablet by mouth daily, Disp: , Rfl:       REVIEW OF SYSTEMS  Review of Systems   Constitutional: Positive for fatigue  HENT: Negative  Eyes: Negative  Respiratory: Negative  Cardiovascular: Negative  Gastrointestinal: Negative  Endocrine: Negative  Genitourinary: Negative  Psychiatric/Behavioral: Negative  Current Vitals:   Blood Pressure: 112/60 (02/20/18 1023)  Pulse: 83 (02/20/18 1023)  Temperature: (!) 96 9 °F (36 1 °C) (02/20/18 1023)  Respirations: 18 (02/20/18 1023)  Height: 4' 6" (137 2 cm) (02/20/18 1023)  Weight - Scale: 39 5 kg (87 lb) (02/20/18 1023)  SpO2: 96 % (02/20/18 1023)      PHYSICAL EXAMS:  Physical Exam   Constitutional: She is oriented to person, place, and time  She appears well-developed and well-nourished  HENT:   Head: Normocephalic and atraumatic  Eyes: EOM are normal  Pupils are equal, round, and reactive to light  Neck: Normal range of motion  Neck supple  No thyromegaly present  Cardiovascular: Normal rate, regular rhythm and normal heart sounds  Pulmonary/Chest: Effort normal and breath sounds normal    Neurological: She is alert and oriented to person, place, and time  Skin: Skin is warm and dry  Psychiatric: She has a normal mood and affect  Lab Results:          Sharron Mcadams PA-C  2/20/2018, 10:36 AM

## 2018-02-26 DIAGNOSIS — D64.9 LOW HEMOGLOBIN: Primary | ICD-10-CM

## 2018-03-08 ENCOUNTER — OFFICE VISIT (OUTPATIENT)
Dept: FAMILY MEDICINE CLINIC | Facility: CLINIC | Age: 83
End: 2018-03-08
Payer: MEDICARE

## 2018-03-08 VITALS
RESPIRATION RATE: 18 BRPM | HEIGHT: 55 IN | HEART RATE: 84 BPM | DIASTOLIC BLOOD PRESSURE: 60 MMHG | WEIGHT: 87 LBS | SYSTOLIC BLOOD PRESSURE: 112 MMHG | BODY MASS INDEX: 20.13 KG/M2 | TEMPERATURE: 92.9 F | OXYGEN SATURATION: 98 %

## 2018-03-08 DIAGNOSIS — D50.8 OTHER IRON DEFICIENCY ANEMIA: Primary | ICD-10-CM

## 2018-03-08 PROCEDURE — 99213 OFFICE O/P EST LOW 20 MIN: CPT | Performed by: FAMILY MEDICINE

## 2018-03-08 NOTE — PROGRESS NOTES
History and Physical  Danitza Carrier Matt 80 y o  female MRN: [id number]      Assessment:   Anemia    Plan:  Keep appt with Gi  RTC as previously scheduled  Chief Complaint   Patient presents with    Follow-up        HPI:  Jenifer Nevarez is a 80 y o  female who presents for follow up  She is doing well  She has appt with GI later this month due to anemia  Her BUN and Creatinine were also elevated and discussed with Dr Alannah Bryant who felt it was most probably due to GI bleed  She denies any black or bloody stool      Historical Information   Past Medical History:   Diagnosis Date    Cancer ([address] )     lung    Hyperlipidemia     Hypertension     Hyponatremia     Small bowel obstruction     last assessed 16     Past Surgical History:   Procedure Laterality Date    CATARACT EXTRACTION       SECTION      CHEST WALL BIOPSY N/A 2016    Procedure: Uterine Biopsy;  Surgeon: Radha Quinteros MD;  [facility];  Service:     LAPAROTOMY N/A 2016    Procedure: LAPAROTOMY EXPLORATORY, LYSIS OF Uterine ADHESIONS;  Surgeon: Army Bianca DO;  [facility];  Service:     LUNG LOBECTOMY       Social History   History   Alcohol Use No     History   Drug Use No     History   Smoking Status    Never Smoker   Smokeless Tobacco    Not on file     Family History   Problem Relation Age of Onset    No Known Problems Family        Meds/Allergies   No Known Allergies    Meds:    Current Outpatient Prescriptions:     acetaminophen-codeine (TYLENOL #3) 300-30 mg per tablet, Take 1 tablet by mouth every 8 (eight) hours as needed for moderate pain, Disp: 30 tablet, Rfl: 0    Ascorbic Acid (VITAMIN C) 250 MG CHEW, Chew 1 tablet daily, Disp: , Rfl:     hydrOXYzine HCL (ATARAX) 10 mg tablet, Take 10 mg by mouth 3 (three) times a day as needed for itching, Disp: , Rfl:     Ibuprofen (ADVIL PO), Take by mouth, Disp: , Rfl:     lisinopril 20 mg TABS 20 mg, hydrochlorothiazide 12 5 mg TABS 12 5 mg, Take 1 tablet by mouth daily, Disp: , Rfl:       REVIEW OF SYSTEMS  Review of Systems   Constitutional: Positive for fatigue  HENT: Negative  Eyes: Negative  Respiratory: Negative  Cardiovascular: Negative  Gastrointestinal:        As per HPI  Endocrine: Negative  Genitourinary: Negative  Musculoskeletal: Negative  Allergic/Immunologic: Negative  Neurological: Negative  Hematological: Negative  Psychiatric/Behavioral: Negative  Current Vitals:   Blood Pressure: 112/60 (03/08/18 1020)  Pulse: 84 (03/08/18 1020)  Temperature: (!) 92 9 °F (33 8 °C) (03/08/18 1020)  Respirations: 18 (03/08/18 1020)  Height: 4' 6" (137 2 cm) (03/08/18 1020)  Weight - Scale: 39 5 kg (87 lb) (03/08/18 1020)  SpO2: 98 % (03/08/18 1020)      PHYSICAL EXAMS:  Physical Exam   Constitutional: She is oriented to person, place, and time  She appears well-developed and well-nourished  HENT:   Head: Normocephalic and atraumatic  Right Ear: External ear normal    Left Ear: External ear normal    Neck: Normal range of motion  Neck supple  No thyromegaly present  Cardiovascular: Normal rate, regular rhythm and normal heart sounds  Pulmonary/Chest: Effort normal    Few scattered rhonchi that cleared after cough  Neurological: She is alert and oriented to person, place, and time  Skin: Skin is warm and dry  Psychiatric: She has a normal mood and affect  Lab Results:          Larry Dodd PA-C  3/8/2018, 10:40 AM

## 2018-03-12 ENCOUNTER — APPOINTMENT (EMERGENCY)
Dept: RADIOLOGY | Facility: HOSPITAL | Age: 83
End: 2018-03-12
Payer: MEDICARE

## 2018-03-12 ENCOUNTER — HOSPITAL ENCOUNTER (EMERGENCY)
Facility: HOSPITAL | Age: 83
Discharge: HOME | End: 2018-03-12
Attending: EMERGENCY MEDICINE
Payer: MEDICARE

## 2018-03-12 ENCOUNTER — APPOINTMENT (EMERGENCY)
Dept: CT IMAGING | Facility: HOSPITAL | Age: 83
End: 2018-03-12
Payer: MEDICARE

## 2018-03-12 VITALS
TEMPERATURE: 98.6 F | RESPIRATION RATE: 16 BRPM | DIASTOLIC BLOOD PRESSURE: 74 MMHG | BODY MASS INDEX: 21.7 KG/M2 | HEART RATE: 80 BPM | SYSTOLIC BLOOD PRESSURE: 157 MMHG | OXYGEN SATURATION: 100 % | WEIGHT: 90 LBS

## 2018-03-12 DIAGNOSIS — N28.9 RENAL INSUFFICIENCY: ICD-10-CM

## 2018-03-12 DIAGNOSIS — D64.9 LOW HEMOGLOBIN: Primary | ICD-10-CM

## 2018-03-12 LAB
ALBUMIN SERPL BCP-MCNC: 3.5 G/DL (ref 3.5–5)
ALP SERPL-CCNC: 55 U/L (ref 46–116)
ALT SERPL W P-5'-P-CCNC: 21 U/L (ref 12–78)
ANION GAP SERPL CALCULATED.3IONS-SCNC: 13 MMOL/L (ref 4–13)
AST SERPL W P-5'-P-CCNC: 24 U/L (ref 5–45)
ATRIAL RATE: 94 BPM
BASOPHILS # BLD AUTO: 0.01 THOUSANDS/ΜL (ref 0–0.1)
BASOPHILS NFR BLD AUTO: 0 % (ref 0–1)
BILIRUB SERPL-MCNC: 0.4 MG/DL (ref 0.2–1)
BILIRUB UR QL STRIP: NEGATIVE
BUN SERPL-MCNC: 37 MG/DL (ref 5–25)
CALCIUM SERPL-MCNC: 8.9 MG/DL (ref 8.3–10.1)
CHLORIDE SERPL-SCNC: 97 MMOL/L (ref 100–108)
CLARITY UR: CLEAR
CO2 SERPL-SCNC: 24 MMOL/L (ref 21–32)
COLOR UR: YELLOW
CREAT SERPL-MCNC: 1.57 MG/DL (ref 0.6–1.3)
EOSINOPHIL # BLD AUTO: 0.02 THOUSAND/ΜL (ref 0–0.61)
EOSINOPHIL NFR BLD AUTO: 0 % (ref 0–6)
ERYTHROCYTE [DISTWIDTH] IN BLOOD BY AUTOMATED COUNT: 13.1 % (ref 11.6–15.1)
GFR SERPL CREATININE-BSD FRML MDRD: 30 ML/MIN/1.73SQ M
GLUCOSE SERPL-MCNC: 114 MG/DL (ref 65–140)
GLUCOSE UR STRIP-MCNC: NEGATIVE MG/DL
HCT VFR BLD AUTO: 29.1 % (ref 34.8–46.1)
HGB BLD-MCNC: 9.8 G/DL (ref 11.5–15.4)
HGB UR QL STRIP.AUTO: NEGATIVE
KETONES UR STRIP-MCNC: NEGATIVE MG/DL
LACTATE SERPL-SCNC: 1.3 MMOL/L (ref 0.5–2)
LEUKOCYTE ESTERASE UR QL STRIP: NEGATIVE
LYMPHOCYTES # BLD AUTO: 0.94 THOUSANDS/ΜL (ref 0.6–4.47)
LYMPHOCYTES NFR BLD AUTO: 9 % (ref 14–44)
MAGNESIUM SERPL-MCNC: 1.8 MG/DL (ref 1.6–2.6)
MCH RBC QN AUTO: 29.7 PG (ref 26.8–34.3)
MCHC RBC AUTO-ENTMCNC: 33.7 G/DL (ref 31.4–37.4)
MCV RBC AUTO: 88 FL (ref 82–98)
MONOCYTES # BLD AUTO: 0.62 THOUSAND/ΜL (ref 0.17–1.22)
MONOCYTES NFR BLD AUTO: 6 % (ref 4–12)
NEUTROPHILS # BLD AUTO: 8.9 THOUSANDS/ΜL (ref 1.85–7.62)
NEUTS SEG NFR BLD AUTO: 85 % (ref 43–75)
NITRITE UR QL STRIP: NEGATIVE
P AXIS: 43 DEGREES
PH UR STRIP.AUTO: 6.5 [PH] (ref 4.5–8)
PLATELET # BLD AUTO: 306 THOUSANDS/UL (ref 149–390)
PMV BLD AUTO: 9.2 FL (ref 8.9–12.7)
POTASSIUM SERPL-SCNC: 4.2 MMOL/L (ref 3.5–5.3)
PR INTERVAL: 130 MS
PROT SERPL-MCNC: 7.5 G/DL (ref 6.4–8.2)
PROT UR STRIP-MCNC: NEGATIVE MG/DL
QRS AXIS: -8 DEGREES
QRSD INTERVAL: 62 MS
QT INTERVAL: 342 MS
QTC INTERVAL: 427 MS
RBC # BLD AUTO: 3.3 MILLION/UL (ref 3.81–5.12)
SODIUM SERPL-SCNC: 134 MMOL/L (ref 136–145)
SP GR UR STRIP.AUTO: 1.01 (ref 1–1.03)
T WAVE AXIS: 32 DEGREES
TROPONIN I SERPL-MCNC: <0.02 NG/ML
TROPONIN I SERPL-MCNC: <0.02 NG/ML
TSH SERPL DL<=0.05 MIU/L-ACNC: 1.08 UIU/ML (ref 0.36–3.74)
UROBILINOGEN UR QL STRIP.AUTO: 0.2 E.U./DL
VENTRICULAR RATE: 94 BPM
WBC # BLD AUTO: 10.49 THOUSAND/UL (ref 4.31–10.16)

## 2018-03-12 PROCEDURE — 96361 HYDRATE IV INFUSION ADD-ON: CPT

## 2018-03-12 PROCEDURE — 93005 ELECTROCARDIOGRAM TRACING: CPT

## 2018-03-12 PROCEDURE — 96360 HYDRATION IV INFUSION INIT: CPT

## 2018-03-12 PROCEDURE — 70450 CT HEAD/BRAIN W/O DYE: CPT

## 2018-03-12 PROCEDURE — 36415 COLL VENOUS BLD VENIPUNCTURE: CPT | Performed by: EMERGENCY MEDICINE

## 2018-03-12 PROCEDURE — 80053 COMPREHEN METABOLIC PANEL: CPT | Performed by: EMERGENCY MEDICINE

## 2018-03-12 PROCEDURE — 99285 EMERGENCY DEPT VISIT HI MDM: CPT

## 2018-03-12 PROCEDURE — 93010 ELECTROCARDIOGRAM REPORT: CPT | Performed by: INTERNAL MEDICINE

## 2018-03-12 PROCEDURE — 85025 COMPLETE CBC W/AUTO DIFF WBC: CPT | Performed by: EMERGENCY MEDICINE

## 2018-03-12 PROCEDURE — 83605 ASSAY OF LACTIC ACID: CPT | Performed by: EMERGENCY MEDICINE

## 2018-03-12 PROCEDURE — 81003 URINALYSIS AUTO W/O SCOPE: CPT | Performed by: EMERGENCY MEDICINE

## 2018-03-12 PROCEDURE — 83735 ASSAY OF MAGNESIUM: CPT | Performed by: EMERGENCY MEDICINE

## 2018-03-12 PROCEDURE — 84443 ASSAY THYROID STIM HORMONE: CPT | Performed by: EMERGENCY MEDICINE

## 2018-03-12 PROCEDURE — 71046 X-RAY EXAM CHEST 2 VIEWS: CPT

## 2018-03-12 PROCEDURE — 84484 ASSAY OF TROPONIN QUANT: CPT | Performed by: EMERGENCY MEDICINE

## 2018-03-12 RX ADMIN — SODIUM CHLORIDE 500 ML: 0.9 INJECTION, SOLUTION INTRAVENOUS at 18:33

## 2018-03-12 RX ADMIN — SODIUM CHLORIDE 500 ML: 0.9 INJECTION, SOLUTION INTRAVENOUS at 15:51

## 2018-03-12 NOTE — ED NOTES
Patient's granddaughter, Randi Grigsby, called for an update  Family was notified that patient was being discharged  Patient states she takes a taxi home        Sharan Hernandez RN  03/12/18 1919

## 2018-03-12 NOTE — ED PROCEDURE NOTE
PROCEDURE  ECG 12 Lead Documentation  Date/Time: 3/12/2018 3:31 PM  Performed by: Dai Shafer  Authorized by: Dai Shafer     Indications / Diagnosis:  Dizziness   ECG reviewed by me, the ED Provider: yes    Patient location:  ED  Previous ECG:     Previous ECG:  Unavailable  Interpretation:     Interpretation: non-specific    Rate:     ECG rate:  94    ECG rate assessment: normal    Rhythm:     Rhythm: sinus rhythm    ST segments:     ST segments:  Non-specific         Geovani Faria DO  03/12/18 1534

## 2018-03-12 NOTE — DISCHARGE INSTRUCTIONS
Anemia   WHAT YOU NEED TO KNOW:   Anemia is a low number of red blood cells or a low amount of hemoglobin in your red blood cells  Hemoglobin is a protein that helps carry oxygen throughout your body  Red blood cells use iron to create hemoglobin  Anemia may develop if your body does not have enough iron  It may also develop if your body does not make enough red blood cells or they die faster than your body can make them  DISCHARGE INSTRUCTIONS:   Call 911 or have someone call 911 for any of the following:   · You lose consciousness  · You have severe chest pain  Return to the emergency department if:   · You have dark or bloody bowel movements  Contact your healthcare provider if:   · Your symptoms are worse, even after treatment  · You have questions or concerns about your condition or care  Medicines:   · Iron or folic acid supplements  help increase your red blood cell and hemoglobin levels  · Vitamin B12 injections  may help boost your red blood cell level and decrease your symptoms  Ask your healthcare provider how to inject B12  · Take your medicine as directed  Contact your healthcare provider if you think your medicine is not helping or if you have side effects  Tell him of her if you are allergic to any medicine  Keep a list of the medicines, vitamins, and herbs you take  Include the amounts, and when and why you take them  Bring the list or the pill bottles to follow-up visits  Carry your medicine list with you in case of an emergency  Prevent anemia:  Eat healthy foods rich in iron and vitamin C  Nuts, meat, dark leafy green vegetables, and beans are high in iron and protein  Vitamin C helps your body absorb iron  Foods rich in vitamin C include oranges and other citrus fruits  Ask your healthcare provider for a list of other foods that are high in iron or vitamin C  Ask if you need to be on a special diet     Follow up with your healthcare provider as directed:  Write down your questions so you remember to ask them during your visits  © 2017 [employer] Information is for End User's use only and may not be sold, redistributed or otherwise used for commercial purposes  All illustrations and images included in CareNotes® are the copyrighted property of A [employer] M [employer]  or [employer]eg[employer]  The above information is an  only  It is not intended as medical advice for individual conditions or treatments  Talk to your doctor, nurse or pharmacist before following any medical regimen to see if it is safe and effective for you  Impaired Kidney Function   WHAT YOU NEED TO KNOW:   Impaired kidney function is when your kidneys are not working as well as they should  Normally, kidneys remove fluid, chemicals, and waste from your blood  These wastes are removed from your body in the urine made by your kidneys  If impaired kidney function is not treated or gets worse, it may lead to long-term kidney disease or kidney failure  DISCHARGE INSTRUCTIONS:   Return to the emergency department if:   · You have fluid buildup in your legs  · You have trouble breathing  · You urinate less than you normally do  · You have dark colored urine  Contact your healthcare provider if:   · You have a fever  · You have abdominal or low back pain  · Your skin is itchy or you have a rash  · You have nausea, vomit repeatedly, or have severe diarrhea  · You have fatigue or muscle weakness  · You have hiccups that will not stop  · You have questions or concerns about your condition or care  Follow up with your healthcare provider as directed: You will need to return for tests to find the cause of your impaired kidney function  Write down your questions so you remember to ask them during your visits  Support kidney function:   · Manage other health conditions  such as diabetes, high blood pressure, or heart disease   These conditions stress your kidneys  · Talk to your healthcare provider before you take over-the-counter-medicine  NSAIDs, stomach medicine, or laxatives may harm your kidneys  · Limit alcohol  Ask how much alcohol is safe for you to drink  A drink of alcohol is 12 ounces of beer, 5 ounces of wine, or 1½ ounces of liquor  · Do not smoke  Nicotine can damage blood vessels and make it more difficult to manage your impaired kidney function  Smoking also harms your kidneys  Do not use e-cigarettes or smokeless tobacco in place of cigarettes or to help you quit  They still contain nicotine  Ask your healthcare provider for information if you currently smoke and need help quitting  © 2017 [employer]  Information is for End User's use only and may not be sold, redistributed or otherwise used for commercial purposes  All illustrations and images included in CareNotes® are the copyrighted property of [employer]  or [employer]ian Veneg[employer]  The above information is an  only  It is not intended as medical advice for individual conditions or treatments  Talk to your doctor, nurse or pharmacist before following any medical regimen to see if it is safe and effective for you

## 2018-03-12 NOTE — ED PROVIDER NOTES
History  Chief Complaint   Patient presents with    Dizziness     Patient states that starting today, she would get dizzy when walking  Patient states when she got dizzy, she sat down  Patient denies falling  Patient also stats she only had two hours of sleep last night  59-year-old female presents complaining of dizziness  She states that her dizziness consisted of feeling like the room was spinning while she was walking in a  Store  She states she experienced this twice today  The staff at the store called EMS  Upon arrival of EMS patient's symptoms had resolved  Patient recently had blood work drawn to [id number] which demonstrated a sodium 132 ( the patient is chronically hyponatremic) a BUN of 54 and a creatinine of 1 84 which is elevated from her baseline  At present patient states she feels well        History provided by:  Patient  History limited by: Patient initially offered no complaints for EMS  Dizziness   Quality:  Head spinning  Severity:  Moderate  Onset quality:  Sudden  Duration: minutes   Timing:  Intermittent  Progression:  Waxing and waning  Chronicity:  New  Context: physical activity    Relieved by:  Being still  Worsened by: Movement  Ineffective treatments:  None tried  Associated symptoms: no blood in stool, no chest pain and no diarrhea    Risk factors: no anemia and no heart disease    Risk factors comment:  Hyponatremia      Prior to Admission Medications   Prescriptions Last Dose Informant Patient Reported?  Taking?   lisinopril 20 mg TABS 20 mg, hydrochlorothiazide 12 5 mg TABS 12 5 mg   Yes Yes   Sig: Take 1 tablet by mouth daily      Facility-Administered Medications: None       Past Medical History:   Diagnosis Date    Cancer ([facility] [address] )     lung    Hyperlipidemia     Hypertension     Hyponatremia     Small bowel obstruction     last assessed 16       Past Surgical History:   Procedure Laterality Date    CATARACT EXTRACTION       SECTION      CHEST WALL BIOPSY N/A 7/16/2016    Procedure: Uterine Biopsy;  Surgeon: Edilberto Ceron MD;  [facility];  Service:     LAPAROTOMY N/A 7/16/2016    Procedure: LAPAROTOMY EXPLORATORY, LYSIS OF Uterine ADHESIONS;  Surgeon: Ebb Mortimer, DO;  [facility];  Service:     LUNG LOBECTOMY         Family History   Problem Relation Age of Onset    No Known Problems Family      I have reviewed and agree with the history as documented  Social History   Substance Use Topics    Smoking status: Never Smoker    Smokeless tobacco: Never Used    Alcohol use No        Review of Systems   Constitutional: Negative  Negative for activity change, appetite change and chills  HENT: Negative for congestion, dental problem and drooling  Eyes: Negative for pain, discharge and itching  Respiratory: Negative for apnea, choking and chest tightness  Cardiovascular: Negative for chest pain  Gastrointestinal: Negative for blood in stool and diarrhea  Endocrine: Negative for cold intolerance, heat intolerance and polydipsia  Genitourinary: Negative for difficulty urinating, dyspareunia, dysuria and enuresis  Musculoskeletal: Negative for arthralgias, back pain and gait problem  Skin: Negative for color change, pallor and rash  Allergic/Immunologic: Negative for environmental allergies and food allergies  Neurological: Positive for dizziness  Hematological: Negative for adenopathy  Psychiatric/Behavioral: Negative for agitation, behavioral problems, confusion and decreased concentration         Physical Exam  ED Triage Vitals [03/12/18 1504]   Temperature Pulse Respirations Blood Pressure SpO2   98 6 °F (37 °C) 92 16 111/64 99 %      Temp Source Heart Rate Source Patient Position - Orthostatic VS BP Location FiO2 (%)   Temporal Monitor Sitting Right arm --      Pain Score       No Pain           Orthostatic Vital Signs  Vitals:    03/12/18 1504 03/12/18 1625 03/12/18 1827 03/12/18 1920   BP: 111/64 111/92 133/71 157/74   Pulse: 92 89 84 80   Patient Position - Orthostatic VS: Sitting Sitting Sitting Sitting       Physical Exam   Constitutional: She appears well-developed and well-nourished  HENT:   Head: Normocephalic  Right Ear: External ear normal    Left Ear: External ear normal    Eyes: Pupils are equal, round, and reactive to light  Right eye exhibits no discharge  Left eye exhibits no discharge  Neck: Normal range of motion  No JVD present  No tracheal deviation present  No thyromegaly present  Cardiovascular: Normal rate, regular rhythm and normal heart sounds  Pulmonary/Chest: Effort normal  No respiratory distress  She has no wheezes  Abdominal: Soft  She exhibits no distension and no mass  There is no tenderness  There is no guarding  Musculoskeletal: Normal range of motion  She exhibits no edema or deformity  Neurological: She is alert  She displays normal reflexes  No cranial nerve deficit  She exhibits normal muscle tone  Coordination normal    Skin: Skin is warm  Capillary refill takes less than 2 seconds  No erythema  Psychiatric: She has a normal mood and affect  Her behavior is normal  Thought content normal    Vitals reviewed        ED Medications  Medications   sodium chloride 0 9 % bolus 500 mL (0 mL Intravenous Stopped 3/12/18 1730)   sodium chloride 0 9 % bolus 500 mL (0 mL Intravenous Stopped 3/12/18 1919)       Diagnostic Studies  Results Reviewed     Procedure Component Value Units Date/Time    UA w Reflex to Microscopic w Reflex to Culture [[id number]]  (Normal) Collected:  03/12/18 1857    Lab Status:  Final result Specimen:  Urine from Urine, Clean Catch Updated:  03/12/18 1907     Color, UA Yellow     Clarity, UA Clear     Specific Gravity, UA 1 015     pH, UA 6 5     Leukocytes, UA Negative     Nitrite, UA Negative     Protein, UA Negative mg/dl      Glucose, UA Negative mg/dl      Ketones, UA Negative mg/dl      Urobilinogen, UA 0 2 E U /dl      Bilirubin, UA Negative Blood, UA Negative    Troponin I [[id number]]  (Normal) Collected:  03/12/18 1832    Lab Status:  Final result Specimen:  Blood from Arm, Left Updated:  03/12/18 1900     Troponin I <0 02 ng/mL     Narrative:         [employer] Chemistry analyzer 99% cutoff is > 0 04 ng/mL in network labs    o cTnI 99% cutoff is useful only when applied to patients in the clinical setting of myocardial ischemia  o cTnI 99% cutoff should be interpreted in the context of clinical history, ECG findings and possibly cardiac imaging to establish correct diagnosis  o cTnI 99% cutoff may be suggestive but clearly not indicative of a coronary event without the clinical setting of myocardial ischemia  TSH [[id number]]  (Normal) Collected:  03/12/18 1532    Lab Status:  Final result Specimen:  Blood from Arm, Left Updated:  03/12/18 1601     TSH 3RD GENERATON 1 084 uIU/mL     Narrative:         Patients undergoing fluorescein dye angiography may retain small amounts of fluorescein in the body for 48-72 hours post procedure  Samples containing fluorescein can produce falsely depressed TSH values  If the patient had this procedure,a specimen should be resubmitted post fluorescein clearance  The recommended reference ranges for TSH during pregnancy are as follows:  First trimester 0 1 to 2 5 uIU/mL  Second trimester  0 2 to 3 0 uIU/mL  Third trimester 0 3 to 3 0 uIU/m      Lactic acid, plasma [[id number]]  (Normal) Collected:  03/12/18 1532    Lab Status:  Final result Specimen:  Blood from Arm, Left Updated:  03/12/18 1601     LACTIC ACID 1 3 mmol/L     Narrative:         Result may be elevated if tourniquet was used during collection      Comprehensive metabolic panel [[id number]]  (Abnormal) Collected:  03/12/18 1532    Lab Status:  Final result Specimen:  Blood from Arm, Left Updated:  03/12/18 1601     Sodium 134 (L) mmol/L      Potassium 4 2 mmol/L      Chloride 97 (L) mmol/L      CO2 24 mmol/L      Anion Gap 13 mmol/L      BUN 37 (H) mg/dL      Creatinine 1 57 (H) mg/dL      Glucose 114 mg/dL      Calcium 8 9 mg/dL      AST 24 U/L      ALT 21 U/L      Alkaline Phosphatase 55 U/L      Total Protein 7 5 g/dL      Albumin 3 5 g/dL      Total Bilirubin 0 40 mg/dL      eGFR 30 ml/min/1 73sq m     Narrative:         National Kidney Disease Education Program recommendations are as follows:  GFR calculation is accurate only with a steady state creatinine  Chronic Kidney disease less than 60 ml/min/1 73 sq  meters  Kidney failure less than 15 ml/min/1 73 sq  meters  Magnesium [[id number]]  (Normal) Collected:  03/12/18 1532    Lab Status:  Final result Specimen:  Blood from Arm, Left Updated:  03/12/18 1601     Magnesium 1 8 mg/dL     Troponin I [[id number]]  (Normal) Collected:  03/12/18 1532    Lab Status:  Final result Specimen:  Blood from Arm, Left Updated:  03/12/18 1555     Troponin I <0 02 ng/mL     Narrative:         S[employer]ens Chemistry analyzer 99% cutoff is > 0 04 ng/mL in network labs    o cTnI 99% cutoff is useful only when applied to patients in the clinical setting of myocardial ischemia  o cTnI 99% cutoff should be interpreted in the context of clinical history, ECG findings and possibly cardiac imaging to establish correct diagnosis  o cTnI 99% cutoff may be suggestive but clearly not indicative of a coronary event without the clinical setting of myocardial ischemia      CBC and differential [[id number]]  (Abnormal) Collected:  03/12/18 1532    Lab Status:  Final result Specimen:  Blood from Arm, Left Updated:  03/12/18 1539     WBC 10 49 (H) Thousand/uL      RBC 3 30 (L) Million/uL      Hemoglobin 9 8 (L) g/dL      Hematocrit 29 1 (L) %      MCV 88 fL      MCH 29 7 pg      MCHC 33 7 g/dL      RDW 13 1 %      MPV 9 2 fL      Platelets 785 Thousands/uL      Neutrophils Relative 85 (H) %      Lymphocytes Relative 9 (L) %      Monocytes Relative 6 %      Eosinophils Relative 0 %      Basophils Relative 0 %      Neutrophils Absolute 8 90 (H) Thousands/µL      Lymphocytes Absolute 0 94 Thousands/µL      Monocytes Absolute 0 62 Thousand/µL      Eosinophils Absolute 0 02 Thousand/µL      Basophils Absolute 0 01 Thousands/µL                  CT head without contrast   Final Result by Carmen Hall MD (03/12 1723)   Stable exam    No acute intracranial abnormality  Workstation performed: [id number]         XR chest 2 views   Final Result by Carmen Hall MD (03/12 1650)   Stable exam    No acute cardiopulmonary disease  Workstation performed: [id number]                    Procedures  Procedures       Phone Contacts  ED Phone Contact    ED Course  ED Course as of Mar 13 0004   Mon Mar 12, 2018   1713 No infiltrate     1811 WBC: (!) 10 49         HEART Risk Score    Flowsheet Row Most Recent Value   History  1 Filed at: 03/12/2018 1523   ECG  1 Filed at: 03/12/2018 1523   Age  2 Filed at: 03/12/2018 1523   Risk Factors  1 Filed at: 03/12/2018 1523   Troponin  0 Filed at: 03/12/2018 1523   Heart Score Risk Calculator   History  1 Filed at: 03/12/2018 1523   ECG  1 Filed at: 03/12/2018 1523   Age  2 Filed at: 03/12/2018 1523   Risk Factors  1 Filed at: 03/12/2018 1523   Troponin  0 Filed at: 03/12/2018 1523   HEART Score  5 Filed at: 03/12/2018 1523   HEART Score  5 Filed at: 03/12/2018 1523                            MDM  Number of Diagnoses or Management Options  Diagnosis management comments: Differential diagnosis 1  CVA 2  Electrolyte imbalance 3  Infectious process:  Pneumonia versus UTI  I will perform CT scan of patient's head unfortunately secondary to her creatinine clearance will have to do a noncontrast not a CTA of the head  Performed chest x-ray EKG laboratory evaluation along with urinalysis and chest x-ray      I spoke with patient's granddaughter- Dioni Sample - she is looking for a ride          Amount and/or Complexity of Data Reviewed  Clinical lab tests: reviewed  Tests in the radiology section of CPT®: reviewed  Tests in the medicine section of CPT®: reviewed    Risk of Complications, Morbidity, and/or Mortality  Presenting problems: high  Diagnostic procedures: high  Management options: high      CritCare Time    Disposition  Final diagnoses:   Low hemoglobin   Renal insufficiency     Time reflects when diagnosis was documented in both MDM as applicable and the Disposition within this note     Time User Action Codes Description Comment    3/12/2018  7:13 PM Randa Vasques [D64 9] Low hemoglobin     3/12/2018  7:13 PM Randa Vasques [N28 9] Renal insufficiency       ED Disposition     ED Disposition Condition Comment    Discharge  [address] discharge to home/self care  Condition at discharge: Good        Follow-up Information    None       Discharge Medication List as of 3/12/2018  7:13 PM      CONTINUE these medications which have NOT CHANGED    Details   lisinopril 20 mg TABS 20 mg, hydrochlorothiazide 12 5 mg TABS 12 5 mg Take 1 tablet by mouth daily, Until Discontinued, Historical Med           No discharge procedures on file      ED Provider  Electronically Signed by           Sanjuana Serrano DO  03/13/18 0004

## 2018-03-27 ENCOUNTER — HOSPITAL ENCOUNTER (INPATIENT)
Facility: HOSPITAL | Age: 83
LOS: 3 days | Discharge: HOME | DRG: 811 | End: 2018-03-30
Attending: INTERNAL MEDICINE | Admitting: INTERNAL MEDICINE
Payer: MEDICARE

## 2018-03-27 ENCOUNTER — OFFICE VISIT (OUTPATIENT)
Dept: GASTROENTEROLOGY | Facility: HOSPITAL | Age: 83
End: 2018-03-27
Payer: MEDICARE

## 2018-03-27 ENCOUNTER — HOSPITAL ENCOUNTER (INPATIENT)
Facility: HOSPITAL | Age: 83
LOS: 1 days | DRG: 812 | End: 2018-03-27
Attending: FAMILY MEDICINE | Admitting: FAMILY MEDICINE
Payer: MEDICARE

## 2018-03-27 VITALS
HEART RATE: 85 BPM | RESPIRATION RATE: 18 BRPM | HEIGHT: 55 IN | DIASTOLIC BLOOD PRESSURE: 82 MMHG | WEIGHT: 88.18 LBS | OXYGEN SATURATION: 100 % | TEMPERATURE: 98.4 F | BODY MASS INDEX: 20.41 KG/M2 | SYSTOLIC BLOOD PRESSURE: 142 MMHG

## 2018-03-27 VITALS
HEART RATE: 84 BPM | DIASTOLIC BLOOD PRESSURE: 73 MMHG | WEIGHT: 86 LBS | TEMPERATURE: 97.7 F | BODY MASS INDEX: 20.74 KG/M2 | SYSTOLIC BLOOD PRESSURE: 123 MMHG

## 2018-03-27 DIAGNOSIS — D64.9 LOW HEMOGLOBIN: Primary | ICD-10-CM

## 2018-03-27 DIAGNOSIS — R29.6 RECURRENT FALLS: ICD-10-CM

## 2018-03-27 DIAGNOSIS — D64.9 ANEMIA: Primary | ICD-10-CM

## 2018-03-27 DIAGNOSIS — R63.4 WEIGHT LOSS: ICD-10-CM

## 2018-03-27 LAB
ALBUMIN SERPL BCP-MCNC: 3.2 G/DL (ref 3.5–5)
ALP SERPL-CCNC: 59 U/L (ref 46–116)
ALT SERPL W P-5'-P-CCNC: 21 U/L (ref 12–78)
ANION GAP SERPL CALCULATED.3IONS-SCNC: 9 MMOL/L (ref 4–13)
AST SERPL W P-5'-P-CCNC: 26 U/L (ref 5–45)
BASOPHILS # BLD AUTO: 0.03 THOUSANDS/ΜL (ref 0–0.1)
BASOPHILS NFR BLD AUTO: 0 % (ref 0–1)
BILIRUB SERPL-MCNC: 0.2 MG/DL (ref 0.2–1)
BUN SERPL-MCNC: 27 MG/DL (ref 5–25)
CALCIUM SERPL-MCNC: 8.9 MG/DL (ref 8.3–10.1)
CHLORIDE SERPL-SCNC: 100 MMOL/L (ref 100–108)
CO2 SERPL-SCNC: 27 MMOL/L (ref 21–32)
CREAT SERPL-MCNC: 1.06 MG/DL (ref 0.6–1.3)
EOSINOPHIL # BLD AUTO: 0.18 THOUSAND/ΜL (ref 0–0.61)
EOSINOPHIL NFR BLD AUTO: 2 % (ref 0–6)
ERYTHROCYTE [DISTWIDTH] IN BLOOD BY AUTOMATED COUNT: 12.7 % (ref 11.6–15.1)
GFR SERPL CREATININE-BSD FRML MDRD: 48 ML/MIN/1.73SQ M
GLUCOSE SERPL-MCNC: 92 MG/DL (ref 65–140)
HCT VFR BLD AUTO: 26.2 % (ref 34.8–46.1)
HGB BLD-MCNC: 8.7 G/DL (ref 11.5–15.4)
INR PPP: 0.96 (ref 0.86–1.16)
LYMPHOCYTES # BLD AUTO: 1.05 THOUSANDS/ΜL (ref 0.6–4.47)
LYMPHOCYTES NFR BLD AUTO: 14 % (ref 14–44)
MAGNESIUM SERPL-MCNC: 1.8 MG/DL (ref 1.6–2.6)
MCH RBC QN AUTO: 29.7 PG (ref 26.8–34.3)
MCHC RBC AUTO-ENTMCNC: 33.2 G/DL (ref 31.4–37.4)
MCV RBC AUTO: 89 FL (ref 82–98)
MONOCYTES # BLD AUTO: 0.66 THOUSAND/ΜL (ref 0.17–1.22)
MONOCYTES NFR BLD AUTO: 9 % (ref 4–12)
NEUTROPHILS # BLD AUTO: 5.52 THOUSANDS/ΜL (ref 1.85–7.62)
NEUTS SEG NFR BLD AUTO: 75 % (ref 43–75)
PLATELET # BLD AUTO: 388 THOUSANDS/UL (ref 149–390)
PMV BLD AUTO: 9.1 FL (ref 8.9–12.7)
POTASSIUM SERPL-SCNC: 4 MMOL/L (ref 3.5–5.3)
PROT SERPL-MCNC: 7.3 G/DL (ref 6.4–8.2)
PROTHROMBIN TIME: 12.7 SECONDS (ref 12.1–14.4)
RBC # BLD AUTO: 2.93 MILLION/UL (ref 3.81–5.12)
SODIUM SERPL-SCNC: 136 MMOL/L (ref 136–145)
TSH SERPL DL<=0.05 MIU/L-ACNC: 0.53 UIU/ML (ref 0.36–3.74)
WBC # BLD AUTO: 7.44 THOUSAND/UL (ref 4.31–10.16)

## 2018-03-27 PROCEDURE — 84443 ASSAY THYROID STIM HORMONE: CPT | Performed by: FAMILY MEDICINE

## 2018-03-27 PROCEDURE — 80053 COMPREHEN METABOLIC PANEL: CPT | Performed by: FAMILY MEDICINE

## 2018-03-27 PROCEDURE — 99223 1ST HOSP IP/OBS HIGH 75: CPT | Performed by: INTERNAL MEDICINE

## 2018-03-27 PROCEDURE — 85610 PROTHROMBIN TIME: CPT | Performed by: FAMILY MEDICINE

## 2018-03-27 PROCEDURE — 83735 ASSAY OF MAGNESIUM: CPT | Performed by: FAMILY MEDICINE

## 2018-03-27 PROCEDURE — 85025 COMPLETE CBC W/AUTO DIFF WBC: CPT | Performed by: FAMILY MEDICINE

## 2018-03-27 PROCEDURE — C9113 INJ PANTOPRAZOLE SODIUM, VIA: HCPCS | Performed by: INTERNAL MEDICINE

## 2018-03-27 PROCEDURE — 99204 OFFICE O/P NEW MOD 45 MIN: CPT | Performed by: INTERNAL MEDICINE

## 2018-03-27 RX ORDER — HYDROCHLOROTHIAZIDE 12.5 MG/1
12.5 CAPSULE, GELATIN COATED ORAL DAILY
COMMUNITY
End: 2018-05-17 | Stop reason: SDUPTHER

## 2018-03-27 RX ORDER — LISINOPRIL 10 MG/1
10 TABLET ORAL DAILY
COMMUNITY
End: 2018-05-17 | Stop reason: SDUPTHER

## 2018-03-27 RX ORDER — ONDANSETRON 2 MG/ML
4 INJECTION INTRAMUSCULAR; INTRAVENOUS EVERY 6 HOURS PRN
Status: DISCONTINUED | OUTPATIENT
Start: 2018-03-27 | End: 2018-03-30 | Stop reason: HOSPADM

## 2018-03-27 RX ORDER — LISINOPRIL 10 MG/1
10 TABLET ORAL DAILY
Status: DISCONTINUED | OUTPATIENT
Start: 2018-03-28 | End: 2018-03-30 | Stop reason: HOSPADM

## 2018-03-27 RX ORDER — PANTOPRAZOLE SODIUM 40 MG/1
40 INJECTION, POWDER, FOR SOLUTION INTRAVENOUS EVERY 12 HOURS SCHEDULED
Status: DISCONTINUED | OUTPATIENT
Start: 2018-03-27 | End: 2018-03-30 | Stop reason: HOSPADM

## 2018-03-27 RX ADMIN — PANTOPRAZOLE SODIUM 40 MG: 40 INJECTION, POWDER, FOR SOLUTION INTRAVENOUS at 20:44

## 2018-03-27 NOTE — ASSESSMENT & PLAN NOTE
CT chest abdomen pelvis as above  Patient sent in by Gastroenterology up at [facility] for evaluation  Will eventually need tentative scope  Will continue with serial hemoglobin and hematocrit checks  Consult Gastroenterology

## 2018-03-27 NOTE — H&P
H&P- Frankie Gutierrez 1933, 80 y o  female MRN: [id number]    Unit/Bed#: -01 Encounter: [id number]    Primary Care Provider: Jelnea Downing PA-C   Date and time admitted to hospital: 3/27/2018  7:30 PM        Anemia   Assessment & Plan    CT chest abdomen pelvis as above  Patient sent in by Gastroenterology up at [facility] for evaluation  Will eventually need tentative scope  Will continue with serial hemoglobin and hematocrit checks  Consult Gastroenterology  Weight loss   Assessment & Plan    Will follow up on CT scan chest abdomen pelvis  Hypertension   Assessment & Plan    Continue with anti hypertensive agents  Adenocarcinoma of lung [facility])   Assessment & Plan    History of prior resection  VTE Prophylaxis: Will hold off on anticoagulation given concerns for possible culture blood loss     Given patient's elevated BUN to creatinine ratio will compared to cover with PPI for the time being  Will have GI workup further  Will make patient NPO after midnight   / sequential compression device   Code Status:  Full code  POLST: There is no POLST form on file for this patient (pre-hospital)  Discussion with family:  Discussed with daughter at bedside    Anticipated Length of Stay:  Patient will be admitted on an Inpatient basis with an anticipated length of stay of  greater than 2 midnights  Justification for Hospital Stay:  Needs further evaluation of anemia and possible malignant process  Total Time for Visit, including Counseling / Coordination of Care: 45 minutes  Greater than 50% of this total time spent on direct patient counseling and coordination of care  Chief Complaint:   Patient presents as a direct admission from the [place] area for anemia    History of Present Illness:    Emiliano Beltran is a 80 y o  female who presents with an evaluation for anemia    Patient was sent from [facility] initially as an admission to [facility] from the GI service  She was being followed by Gastroenterology as an outpatient at [facility]  She was noted to have a drop in her hemoglobin concerning for possible cold blood loss with suspicion for either malignant process, AVM or an advanced adenoma  Due to her change in mental status and recurrent falls with associated weakness she was sent in for further evaluation with plans for EGD and colonoscopy at [facility] initially  The patient was also noted to have weight loss concerning for underlying malignant process and was sent in for CT imaging  She has a known history of prior stage IB lung cancer status post right lower lobe lobectomy by history  Upon initial assessment at [facility] the daughter had elected to have the patient transition to [facility] as a direct admission for further gastroenterology evaluation  The patient was not formally admitted to [facility] and she was brought in by family for further workup evaluation at [facility]  She presents to the hospital with reported symptoms of weight loss at least 15 to 20 lb over the last month  She also notes increasing symptoms of early satiety and dark stools  She was sent to the hospital for further workup evaluation by Dr Aurea Rivas    Review of Systems:    Review of Systems   Constitutional: Negative for activity change and chills  HENT: Negative for congestion, mouth sores, nosebleeds and postnasal drip  Respiratory: Negative for cough, choking and chest tightness  Cardiovascular: Negative for chest pain  Gastrointestinal: Negative for abdominal distention  Genitourinary: Negative for dyspareunia and flank pain  Musculoskeletal: Negative for arthralgias  Neurological: Negative for dizziness, facial asymmetry and numbness  All other systems reviewed and are negative        Past Medical and Surgical History:     Past Medical History:   Diagnosis Date  Cancer ([facility] [address] )     lung    Hyperlipidemia     Hypertension     Hyponatremia     Small bowel obstruction     last assessed 16       Past Surgical History:   Procedure Laterality Date    CATARACT EXTRACTION       SECTION      CHEST WALL BIOPSY N/A 2016    Procedure: Uterine Biopsy;  Surgeon: Gumaro Huang MD;  [facility];  Service:     LAPAROTOMY N/A 2016    Procedure: LAPAROTOMY EXPLORATORY, LYSIS OF Uterine ADHESIONS;  Surgeon: Billie Gomez DO;  [facility];  Service:     LUNG LOBECTOMY         Meds/Allergies:    Prior to Admission medications    Medication Sig Start Date End Date Taking? Authorizing Provider   hydrochlorothiazide (MICROZIDE) 12 5 mg capsule Take 12 5 mg by mouth daily    Historical Provider, MD   lisinopril (ZESTRIL) 10 mg tablet Take 10 mg by mouth daily    Historical Provider, MD   lisinopril 20 mg TABS 20 mg, hydrochlorothiazide 12 5 mg TABS 12 5 mg Take 1 tablet by mouth daily  3/27/18  Historical Provider, MD     I have reviewed home medications with patient personally  Allergies: No Known Allergies    Social History:     Marital Status:    Occupation:  Retired  Patient Pre-hospital Living Situation:  Home with family  Patient Pre-hospital Level of Mobility:  Ambulatory  Patient Pre-hospital Diet Restrictions:  Denies  Substance Use History:   History   Alcohol Use No     History   Smoking Status    Never Smoker   Smokeless Tobacco    Never Used     History   Drug Use No       Family History:    Family History   Problem Relation Age of Onset    No Known Problems Family        Physical Exam:     Vitals:        Physical Exam   Constitutional: She is oriented to person, place, and time  She appears well-developed and well-nourished  HENT:   Head: Normocephalic and atraumatic  Eyes: Conjunctivae are normal  Pupils are equal, round, and reactive to light  Neck: Normal range of motion  Neck supple  No JVD present   No tracheal deviation present  No thyromegaly present  Cardiovascular: Normal rate and regular rhythm  No murmur heard  Pulmonary/Chest: Effort normal and breath sounds normal  No respiratory distress  She has no wheezes  Abdominal: Soft  Bowel sounds are normal  She exhibits no distension  There is no tenderness  Musculoskeletal: Normal range of motion  She exhibits no edema  Neurological: She is alert and oriented to person, place, and time  Skin: Skin is warm and dry  No rash noted  No erythema  Psychiatric: She has a normal mood and affect  Additional Data:     Lab Results: I have personally reviewed pertinent reports  Results from last 7 days  Lab Units 03/27/18  1604   WBC Thousand/uL 7 44   HEMOGLOBIN g/dL 8 7*   HEMATOCRIT % 26 2*   PLATELETS Thousands/uL 388   NEUTROS PCT % 75   LYMPHS PCT % 14   MONOS PCT % 9   EOS PCT % 2       Results from last 7 days  Lab Units 03/27/18  1604   SODIUM mmol/L 136   POTASSIUM mmol/L 4 0   CHLORIDE mmol/L 100   CO2 mmol/L 27   BUN mg/dL 27*   CREATININE mg/dL 1 06   CALCIUM mg/dL 8 9   TOTAL PROTEIN g/dL 7 3   BILIRUBIN TOTAL mg/dL 0 20   ALK PHOS U/L 59   ALT U/L 21   AST U/L 26   GLUCOSE RANDOM mg/dL 92       Results from last 7 days  Lab Units 03/27/18  1604   INR  0 96       Imaging: I have personally reviewed pertinent reports  CT chest abdomen pelvis w contrast    (Results Pending)             ** Please Note: This note has been constructed using a voice recognition system   **

## 2018-03-27 NOTE — PROGRESS NOTES
[facility] - Outpatient Consultation  Keagan Traci 80 y o  female MRN: [id number]  Encounter: [id number]          ASSESSMENT AND PLAN:      1  Anemia -progressive drop in her hemoglobin concerning for occult GI blood loss such as malignancy, AVMs or advanced adenoma  We should also rule out other hematology disorders  Due to her change in mental status and recurrent fall, I recommend admitting her to the hospital for further workup  I discussed this with Dr Jamison Stephenson  She will also need inpatient EGD and colonoscopy  2  Recurrent falls -inpatient physical therapy evaluation  3  Weight loss -concerning for underlying malignancy  -admitted to the hospital for further workup  CT scan of chest abdomen and pelvis  4  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     I tried calling her daughter to discuss her care  I left a voicemail to call back for further discussion  We will wait to hear back from her daughter prior to ordering bowel prep for endoscopic evaluation  ______________________________________________________________________    HPI:  49-year-old female with history of adenocarcinoma of the lung status post lobectomy 3-4 years ago here for evaluation of anemia  Patient is here with her friend  Patient appears to be confused and not able to provide detail history  She was noted to have progressive drop in hemoglobin  Her baseline hemoglobin is around 11  Most recent hemoglobin from March 12, 2018 showed hemoglobin of 9 8  Her MCV is 88  WBC 10 49  Platelet count of 322  Per her friend patient has been losing weight  She also has decreased p  o  Intake  Patient denies abdominal pain, nausea, vomiting, melena or hematochezia  She had internal hernia requiring surgical intervention few years ago      She she never had EGD or colonoscopy in the past     REVIEW OF SYSTEMS:    CONSTITUTIONAL: Denies any fever, chills, rigors, and weight loss  HEENT: No earache or tinnitus  Denies hearing loss or visual disturbances  CARDIOVASCULAR: No chest pain or palpitations  RESPIRATORY: Denies any cough, hemoptysis, shortness of breath or dyspnea on exertion  GASTROINTESTINAL: As noted in the History of Present Illness  GENITOURINARY: No problems with urination  Denies any hematuria or dysuria  NEUROLOGIC: No dizziness or vertigo, denies headaches  MUSCULOSKELETAL: Denies any muscle or joint pain  SKIN: Denies skin rashes or itching  ENDOCRINE: Denies excessive thirst  Denies intolerance to heat or cold  PSYCHOSOCIAL: Denies depression or anxiety  Denies any recent memory loss  Historical Information   Past Medical History:   Diagnosis Date    Cancer ([facility] [address] )     lung    Hyperlipidemia     Hypertension     Hyponatremia     Small bowel obstruction     last assessed 16     Past Surgical History:   Procedure Laterality Date    CATARACT EXTRACTION       SECTION      CHEST WALL BIOPSY N/A 2016    Procedure: Uterine Biopsy;  Surgeon: David Hernández MD;  [facility];  Service:     LAPAROTOMY N/A 2016    Procedure: LAPAROTOMY EXPLORATORY, LYSIS OF Uterine ADHESIONS;  Surgeon: Pedro Daly DO;  [facility];  Service:     LUNG LOBECTOMY       Social History   History   Alcohol Use No     History   Drug Use No     History   Smoking Status    Never Smoker   Smokeless Tobacco    Never Used     Family History   Problem Relation Age of Onset    No Known Problems Family        Meds/Allergies       Current Outpatient Prescriptions:     lisinopril 20 mg TABS 20 mg, hydrochlorothiazide 12 5 mg TABS 12 5 mg    No Known Allergies        Objective     Blood pressure 123/73, pulse 84, temperature 97 7 °F (36 5 °C), temperature source Tympanic, weight 39 kg (86 lb)          PHYSICAL EXAM:      General Appearance:   Alert, cooperative, no distress   HEENT:   Normocephalic, atraumatic, anicteric    Neck:  Supple, symmetrical, trachea midline   Lungs:   Clear to auscultation bilaterally; no rales, rhonchi or wheezing; respirations unlabored    Heart[de-identified]   Regular rate and rhythm; no murmur, rub, or gallop  Abdomen:   Soft, non-tender, non-distended; normal bowel sounds; no masses, no organomegaly    Genitalia:   Deferred    Rectal:   Deferred    Extremities:  No cyanosis, clubbing or edema    Pulses:  2+ and symmetric    Skin:  No jaundice, rashes, or lesions    Lymph nodes:  No palpable cervical lymphadenopathy        Lab Results:   No visits with results within 1 Day(s) from this visit     Latest known visit with results is:   Admission on 03/12/2018, Discharged on 03/12/2018   Component Date Value    WBC 03/12/2018 10 49*    RBC 03/12/2018 3 30*    Hemoglobin 03/12/2018 9 8*    Hematocrit 03/12/2018 29 1*    MCV 03/12/2018 88     MCH 03/12/2018 29 7     MCHC 03/12/2018 33 7     RDW 03/12/2018 13 1     MPV 03/12/2018 9 2     Platelets 43/04/8116 306     Neutrophils Relative 03/12/2018 85*    Lymphocytes Relative 03/12/2018 9*    Monocytes Relative 03/12/2018 6     Eosinophils Relative 03/12/2018 0     Basophils Relative 03/12/2018 0     Neutrophils Absolute 03/12/2018 8 90*    Lymphocytes Absolute 03/12/2018 0 94     Monocytes Absolute 03/12/2018 0 62     Eosinophils Absolute 03/12/2018 0 02     Basophils Absolute 03/12/2018 0 01     Sodium 03/12/2018 134*    Potassium 03/12/2018 4 2     Chloride 03/12/2018 97*    CO2 03/12/2018 24     Anion Gap 03/12/2018 13     BUN 03/12/2018 37*    Creatinine 03/12/2018 1 57*    Glucose 03/12/2018 114     Calcium 03/12/2018 8 9     AST 03/12/2018 24     ALT 03/12/2018 21     Alkaline Phosphatase 03/12/2018 55     Total Protein 03/12/2018 7 5     Albumin 03/12/2018 3 5     Total Bilirubin 03/12/2018 0 40     eGFR 03/12/2018 30     TSH 3RD GENERATON 03/12/2018 1 084     Color, UA 03/12/2018 Yellow     Clarity, UA 03/12/2018 Clear     Specific Gravity, UA 03/12/2018 1 015     pH, UA 03/12/2018 6 5     Leukocytes, UA 03/12/2018 Negative     Nitrite, UA 03/12/2018 Negative     Protein, UA 03/12/2018 Negative     Glucose, UA 03/12/2018 Negative     Ketones, UA 03/12/2018 Negative     Urobilinogen, UA 03/12/2018 0 2     Bilirubin, UA 03/12/2018 Negative     Blood, UA 03/12/2018 Negative     Magnesium 03/12/2018 1 8     LACTIC ACID 03/12/2018 1 3     Troponin I 03/12/2018 <0 02     Troponin I 03/12/2018 <0 02     Ventricular Rate 03/12/2018 94     Atrial Rate 03/12/2018 94     CO Interval 03/12/2018 130     QRSD Interval 03/12/2018 62     QT Interval 03/12/2018 342     QTC Interval 03/12/2018 427     P Axis 03/12/2018 43     QRS Axis 03/12/2018 -8     T Wave [place] 03/12/2018 32          Radiology Results:   Xr Chest 2 Views    Result Date: 3/12/2018  Narrative: CHEST INDICATION:   cough  COMPARISON:  7/6/2017 EXAM PERFORMED/VIEWS:  XR CHEST PA & LATERAL FINDINGS: Heart shadow is enlarged but unchanged from prior exam   Atherosclerotic aorta  Prominent right hilar region, though stable from prior exams, likely vascular  The lungs are clear  No pneumothorax or pleural effusion  Stable osseous structures  Compression deformities in the lumbar spine again noted  Impression: Stable exam  No acute cardiopulmonary disease  Workstation performed: [id number]     Ct Head Without Contrast    Result Date: 3/12/2018  Narrative: CT BRAIN - WITHOUT CONTRAST INDICATION:   dizziness  COMPARISON:  3/4/2017 TECHNIQUE:  CT examination of the brain was performed  In addition to axial images, coronal 2D reformatted images were created and submitted for interpretation  Radiation dose length product (DLP) for this visit:  1077 47 mGy-cm     This examination, like all CT scans performed in the [facility], was performed utilizing techniques to minimize radiation dose exposure, including the use of iterative reconstruction and automated exposure control  IMAGE QUALITY:  Diagnostic  FINDINGS: PARENCHYMA: Decreased attenuation is noted in periventricular and subcortical white matter demonstrating an appearance that is statistically most likely to represent moderate microangiopathic change  Vascular opacification  Stable lacunar infarct left thalamus  Stable basal ganglia calcification  Stable small calcification inferior anterior cranial fossa, possibly a small meningioma  No CT signs of acute infarction  No intracranial mass, mass effect or midline shift  No acute parenchymal hemorrhage  VENTRICLES AND EXTRA-AXIAL SPACES:  Enlargement of ventricles and extra-axial CSF spaces, out of proportion to the patient's age most consistent with cerebral and cerebellar atrophy  This appears stable  VISUALIZED ORBITS AND PARANASAL SINUSES:  Unremarkable  CALVARIUM AND EXTRACRANIAL SOFT TISSUES:  Normal      Impression: Stable exam  No acute intracranial abnormality   Workstation performed: [id number]

## 2018-03-27 NOTE — LETTER
March 27, 2018     Jodie Landa PA-C  [address] [place] [id number]    Patient: Ronit Lynch   YOB: 1933   Date of Visit: 3/27/2018       Dear Dr Ochoa Fermin: Thank you for referring Lindsay Guerrero to me for evaluation  Below are my notes for this consultation  If you have questions, please do not hesitate to call me  I look forward to following your patient along with you  Sincerely,        Shelly Covington MD        CC: No Recipients  Shelly Covington MD  3/27/2018  3:18 PM  Sign at close encounter  [facility] - Outpatient Consultation  Ronit Lynch 80 y o  female MRN: [id number]  Encounter: [id number]          ASSESSMENT AND PLAN:      1  Anemia -progressive drop in her hemoglobin concerning for occult GI blood loss such as malignancy, AVMs or advanced adenoma  We should also rule out other hematology disorders  Due to her change in mental status and recurrent fall, I recommend admitting her to the hospital for further workup  I discussed this with Dr Chey Wyatt  She will also need inpatient EGD and colonoscopy  2  Recurrent falls -inpatient physical therapy evaluation  3  Weight loss -concerning for underlying malignancy  -admitted to the hospital for further workup  CT scan of chest abdomen and pelvis  4  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     I tried calling her daughter to discuss her care  I left a voicemail to call back for further discussion  We will wait to hear back from her daughter prior to ordering bowel prep for endoscopic evaluation  ______________________________________________________________________    HPI:  41-year-old female with history of adenocarcinoma of the lung status post lobectomy 3-4 years ago here for evaluation of anemia  Patient is here with her friend  Patient appears to be confused and not able to provide detail history    She was noted to have progressive drop in hemoglobin  Her baseline hemoglobin is around 11  Most recent hemoglobin from 2018 showed hemoglobin of 9 8  Her MCV is 88  WBC 10 49  Platelet count of 612  Per her friend patient has been losing weight  She also has decreased p  o  Intake  Patient denies abdominal pain, nausea, vomiting, melena or hematochezia  She had internal hernia requiring surgical intervention few years ago  She she never had EGD or colonoscopy in the past     REVIEW OF SYSTEMS:    CONSTITUTIONAL: Denies any fever, chills, rigors, and weight loss  HEENT: No earache or tinnitus  Denies hearing loss or visual disturbances  CARDIOVASCULAR: No chest pain or palpitations  RESPIRATORY: Denies any cough, hemoptysis, shortness of breath or dyspnea on exertion  GASTROINTESTINAL: As noted in the History of Present Illness  GENITOURINARY: No problems with urination  Denies any hematuria or dysuria  NEUROLOGIC: No dizziness or vertigo, denies headaches  MUSCULOSKELETAL: Denies any muscle or joint pain  SKIN: Denies skin rashes or itching  ENDOCRINE: Denies excessive thirst  Denies intolerance to heat or cold  PSYCHOSOCIAL: Denies depression or anxiety  Denies any recent memory loss         Historical Information   Past Medical History:   Diagnosis Date    Cancer ([facility] [address] )     lung    Hyperlipidemia     Hypertension     Hyponatremia     Small bowel obstruction     last assessed 16     Past Surgical History:   Procedure Laterality Date    CATARACT EXTRACTION       SECTION      CHEST WALL BIOPSY N/A 2016    Procedure: Uterine Biopsy;  Surgeon: Oksana Britton MD;  [facility];  Service:     LAPAROTOMY N/A 2016    Procedure: LAPAROTOMY EXPLORATORY, LYSIS OF Uterine ADHESIONS;  Surgeon: Alis Ware DO;  [facility];  Service:     LUNG LOBECTOMY       Social History   History   Alcohol Use No     History   Drug Use No     History   Smoking Status    Never Smoker Smokeless Tobacco    Never Used     Family History   Problem Relation Age of Onset    No Known Problems Family        Meds/Allergies       Current Outpatient Prescriptions:     lisinopril 20 mg TABS 20 mg, hydrochlorothiazide 12 5 mg TABS 12 5 mg    No Known Allergies        Objective     Blood pressure 123/73, pulse 84, temperature 97 7 °F (36 5 °C), temperature source Tympanic, weight 39 kg (86 lb)  PHYSICAL EXAM:      General Appearance:   Alert, cooperative, no distress   HEENT:   Normocephalic, atraumatic, anicteric      Neck:  Supple, symmetrical, trachea midline   Lungs:   Clear to auscultation bilaterally; no rales, rhonchi or wheezing; respirations unlabored    Heart[de-identified]   Regular rate and rhythm; no murmur, rub, or gallop  Abdomen:   Soft, non-tender, non-distended; normal bowel sounds; no masses, no organomegaly    Genitalia:   Deferred    Rectal:   Deferred    Extremities:  No cyanosis, clubbing or edema    Pulses:  2+ and symmetric    Skin:  No jaundice, rashes, or lesions    Lymph nodes:  No palpable cervical lymphadenopathy        Lab Results:   No visits with results within 1 Day(s) from this visit     Latest known visit with results is:   Admission on 03/12/2018, Discharged on 03/12/2018   Component Date Value    WBC 03/12/2018 10 49*    RBC 03/12/2018 3 30*    Hemoglobin 03/12/2018 9 8*    Hematocrit 03/12/2018 29 1*    MCV 03/12/2018 88     MCH 03/12/2018 29 7     MCHC 03/12/2018 33 7     RDW 03/12/2018 13 1     MPV 03/12/2018 9 2     Platelets 99/24/6587 306     Neutrophils Relative 03/12/2018 85*    Lymphocytes Relative 03/12/2018 9*    Monocytes Relative 03/12/2018 6     Eosinophils Relative 03/12/2018 0     Basophils Relative 03/12/2018 0     Neutrophils Absolute 03/12/2018 8 90*    Lymphocytes Absolute 03/12/2018 0 94     Monocytes Absolute 03/12/2018 0 62     Eosinophils Absolute 03/12/2018 0 02     Basophils Absolute 03/12/2018 0 01     Sodium 03/12/2018 134*  Potassium 03/12/2018 4 2     Chloride 03/12/2018 97*    CO2 03/12/2018 24     Anion Gap 03/12/2018 13     BUN 03/12/2018 37*    Creatinine 03/12/2018 1 57*    Glucose 03/12/2018 114     Calcium 03/12/2018 8 9     AST 03/12/2018 24     ALT 03/12/2018 21     Alkaline Phosphatase 03/12/2018 55     Total Protein 03/12/2018 7 5     Albumin 03/12/2018 3 5     Total Bilirubin 03/12/2018 0 40     eGFR 03/12/2018 30     TSH 3RD GENERATON 03/12/2018 1 084     Color, UA 03/12/2018 Yellow     Clarity, UA 03/12/2018 Clear     Specific Gravity, UA 03/12/2018 1 015     pH, UA 03/12/2018 6 5     Leukocytes, UA 03/12/2018 Negative     Nitrite, UA 03/12/2018 Negative     Protein, UA 03/12/2018 Negative     Glucose, UA 03/12/2018 Negative     Ketones, UA 03/12/2018 Negative     Urobilinogen, UA 03/12/2018 0 2     Bilirubin, UA 03/12/2018 Negative     Blood, UA 03/12/2018 Negative     Magnesium 03/12/2018 1 8     LACTIC ACID 03/12/2018 1 3     Troponin I 03/12/2018 <0 02     Troponin I 03/12/2018 <0 02     Ventricular Rate 03/12/2018 94     Atrial Rate 03/12/2018 94     [place] Interval 03/12/2018 130     QRSD Interval 03/12/2018 62     QT Interval 03/12/2018 342     QTC Interval 03/12/2018 427     P Axis 03/12/2018 43     QRS Axis 03/12/2018 -8     T Wave [place] 03/12/2018 32          Radiology Results:   Xr Chest 2 Views    Result Date: 3/12/2018  Narrative: CHEST INDICATION:   cough  COMPARISON:  7/6/2017 EXAM PERFORMED/VIEWS:  XR CHEST PA & LATERAL FINDINGS: Heart shadow is enlarged but unchanged from prior exam   Atherosclerotic aorta  Prominent right hilar region, though stable from prior exams, likely vascular  The lungs are clear  No pneumothorax or pleural effusion  Stable osseous structures  Compression deformities in the lumbar spine again noted  Impression: Stable exam  No acute cardiopulmonary disease   Workstation performed: [id number]     Ct Head Without Contrast    Result Date: 3/12/2018  Narrative: CT BRAIN - WITHOUT CONTRAST INDICATION:   dizziness  COMPARISON:  3/4/2017 TECHNIQUE:  CT examination of the brain was performed  In addition to axial images, coronal 2D reformatted images were created and submitted for interpretation  Radiation dose length product (DLP) for this visit:  1077 47 mGy-cm   This examination, like all CT scans performed in the [facility], was performed utilizing techniques to minimize radiation dose exposure, including the use of iterative reconstruction and automated exposure control  IMAGE QUALITY:  Diagnostic  FINDINGS: PARENCHYMA: Decreased attenuation is noted in periventricular and subcortical white matter demonstrating an appearance that is statistically most likely to represent moderate microangiopathic change  Vascular opacification  Stable lacunar infarct left thalamus  Stable basal ganglia calcification  Stable small calcification inferior anterior cranial fossa, possibly a small meningioma  No CT signs of acute infarction  No intracranial mass, mass effect or midline shift  No acute parenchymal hemorrhage  VENTRICLES AND EXTRA-AXIAL SPACES:  Enlargement of ventricles and extra-axial CSF spaces, out of proportion to the patient's age most consistent with cerebral and cerebellar atrophy  This appears stable  VISUALIZED ORBITS AND PARANASAL SINUSES:  Unremarkable  CALVARIUM AND EXTRACRANIAL SOFT TISSUES:  Normal      Impression: Stable exam  No acute intracranial abnormality   Workstation performed: [id number]

## 2018-03-27 NOTE — PROGRESS NOTES
Admission to [address] was canceled per Dr Jeni Etienne  Pts daughter opted to come  patient from [address] [place] [address] and take her to [facility] where she would be a direct admit there  No acute s/s of distress upon the time that pt left  MD and Hospital Supervisor aware of situation

## 2018-03-28 ENCOUNTER — APPOINTMENT (INPATIENT)
Dept: RADIOLOGY | Facility: HOSPITAL | Age: 83
DRG: 811 | End: 2018-03-28
Payer: MEDICARE

## 2018-03-28 PROBLEM — D62 ANEMIA DUE TO BLOOD LOSS, ACUTE: Status: ACTIVE | Noted: 2017-04-26

## 2018-03-28 LAB
ALBUMIN SERPL BCP-MCNC: 3.1 G/DL (ref 3.5–5)
ALP SERPL-CCNC: 63 U/L (ref 46–116)
ALT SERPL W P-5'-P-CCNC: 14 U/L (ref 12–78)
ANION GAP SERPL CALCULATED.3IONS-SCNC: 7 MMOL/L (ref 4–13)
AST SERPL W P-5'-P-CCNC: 22 U/L (ref 5–45)
BASOPHILS # BLD AUTO: 0.01 THOUSANDS/ΜL (ref 0–0.1)
BASOPHILS NFR BLD AUTO: 0 % (ref 0–1)
BILIRUB SERPL-MCNC: 0.31 MG/DL (ref 0.2–1)
BUN SERPL-MCNC: 17 MG/DL (ref 5–25)
CALCIUM SERPL-MCNC: 8.7 MG/DL (ref 8.3–10.1)
CHLORIDE SERPL-SCNC: 106 MMOL/L (ref 100–108)
CO2 SERPL-SCNC: 26 MMOL/L (ref 21–32)
CREAT SERPL-MCNC: 1.04 MG/DL (ref 0.6–1.3)
EOSINOPHIL # BLD AUTO: 0.18 THOUSAND/ΜL (ref 0–0.61)
EOSINOPHIL NFR BLD AUTO: 3 % (ref 0–6)
ERYTHROCYTE [DISTWIDTH] IN BLOOD BY AUTOMATED COUNT: 13.4 % (ref 11.6–15.1)
GFR SERPL CREATININE-BSD FRML MDRD: 49 ML/MIN/1.73SQ M
GLUCOSE SERPL-MCNC: 89 MG/DL (ref 65–140)
HCT VFR BLD AUTO: 26 % (ref 34.8–46.1)
HGB BLD-MCNC: 8.5 G/DL (ref 11.5–15.4)
LYMPHOCYTES # BLD AUTO: 1.19 THOUSANDS/ΜL (ref 0.6–4.47)
LYMPHOCYTES NFR BLD AUTO: 19 % (ref 14–44)
MAGNESIUM SERPL-MCNC: 2 MG/DL (ref 1.6–2.6)
MCH RBC QN AUTO: 28.8 PG (ref 26.8–34.3)
MCHC RBC AUTO-ENTMCNC: 32.7 G/DL (ref 31.4–37.4)
MCV RBC AUTO: 88 FL (ref 82–98)
MONOCYTES # BLD AUTO: 0.58 THOUSAND/ΜL (ref 0.17–1.22)
MONOCYTES NFR BLD AUTO: 9 % (ref 4–12)
NEUTROPHILS # BLD AUTO: 4.33 THOUSANDS/ΜL (ref 1.85–7.62)
NEUTS SEG NFR BLD AUTO: 69 % (ref 43–75)
NRBC BLD AUTO-RTO: 0 /100 WBCS
PHOSPHATE SERPL-MCNC: 3.2 MG/DL (ref 2.3–4.1)
PLATELET # BLD AUTO: 389 THOUSANDS/UL (ref 149–390)
PMV BLD AUTO: 9 FL (ref 8.9–12.7)
POTASSIUM SERPL-SCNC: 3.6 MMOL/L (ref 3.5–5.3)
PROT SERPL-MCNC: 7.3 G/DL (ref 6.4–8.2)
RBC # BLD AUTO: 2.95 MILLION/UL (ref 3.81–5.12)
SODIUM SERPL-SCNC: 139 MMOL/L (ref 136–145)
WBC # BLD AUTO: 6.31 THOUSAND/UL (ref 4.31–10.16)

## 2018-03-28 PROCEDURE — C9113 INJ PANTOPRAZOLE SODIUM, VIA: HCPCS | Performed by: INTERNAL MEDICINE

## 2018-03-28 PROCEDURE — 99232 SBSQ HOSP IP/OBS MODERATE 35: CPT | Performed by: NURSE PRACTITIONER

## 2018-03-28 PROCEDURE — 97162 PT EVAL MOD COMPLEX 30 MIN: CPT | Performed by: PHYSICAL THERAPIST

## 2018-03-28 PROCEDURE — 74177 CT ABD & PELVIS W/CONTRAST: CPT

## 2018-03-28 PROCEDURE — 80053 COMPREHEN METABOLIC PANEL: CPT | Performed by: INTERNAL MEDICINE

## 2018-03-28 PROCEDURE — 83735 ASSAY OF MAGNESIUM: CPT | Performed by: INTERNAL MEDICINE

## 2018-03-28 PROCEDURE — 85025 COMPLETE CBC W/AUTO DIFF WBC: CPT | Performed by: INTERNAL MEDICINE

## 2018-03-28 PROCEDURE — 99222 1ST HOSP IP/OBS MODERATE 55: CPT | Performed by: INTERNAL MEDICINE

## 2018-03-28 PROCEDURE — G8980 MOBILITY D/C STATUS: HCPCS | Performed by: PHYSICAL THERAPIST

## 2018-03-28 PROCEDURE — G8979 MOBILITY GOAL STATUS: HCPCS | Performed by: PHYSICAL THERAPIST

## 2018-03-28 PROCEDURE — G8978 MOBILITY CURRENT STATUS: HCPCS | Performed by: PHYSICAL THERAPIST

## 2018-03-28 PROCEDURE — 84100 ASSAY OF PHOSPHORUS: CPT | Performed by: INTERNAL MEDICINE

## 2018-03-28 PROCEDURE — 71260 CT THORAX DX C+: CPT

## 2018-03-28 RX ADMIN — PANTOPRAZOLE SODIUM 40 MG: 40 INJECTION, POWDER, FOR SOLUTION INTRAVENOUS at 08:38

## 2018-03-28 RX ADMIN — PANTOPRAZOLE SODIUM 40 MG: 40 INJECTION, POWDER, FOR SOLUTION INTRAVENOUS at 23:04

## 2018-03-28 RX ADMIN — IODIXANOL 75 ML: 320 INJECTION, SOLUTION INTRAVASCULAR at 17:20

## 2018-03-28 RX ADMIN — LISINOPRIL 10 MG: 10 TABLET ORAL at 08:38

## 2018-03-28 NOTE — PHYSICIAN ADVISOR
Current patient class: Inpatient  The patient is currently on Hospital Day: 1      The patient was admitted to the hospital  on 3/27/18 at [phone] for the following diagnosis:  Anemia [D64 9]     After review of the relevant documentation, labs, vital signs and test results, the patient is most appropriate for OUTPATIENT STATUS  In this particular case the patient was admitted to the hospital as an inpatient  The patient however was discharged from the Emergency Department without the use of an inpatient bed  The patient did not utilize inpatient resources and did not satisfy the 2 midnight benchmark  As such the patient is appropriate for OUTPATIENT STATUS  The patient has been discharged prior to this review  The patient is therefore a provider liable case as the status cannot be changed at this point  Rationale is as follows: This determination is for the [facility] encounter  Please see the physician advisor note for the current Eduardo stay  The [facility] admission is INPATIENT      The patients vitals on arrival were ED Triage Vitals [18 1545]   Temperature Pulse Respirations Blood Pressure SpO2   98 4 °F (36 9 °C) 85 18 142/82 100 %      Temp Source Heart Rate Source Patient Position - Orthostatic VS BP Location FiO2 (%)   Temporal -- Lying Right arm --      Pain Score       --           Past Medical History:   Diagnosis Date    Cancer ([address] )     lung    Hyperlipidemia     Hypertension     Hyponatremia     Small bowel obstruction     last assessed 16     Past Surgical History:   Procedure Laterality Date    CATARACT EXTRACTION       SECTION      CHEST WALL BIOPSY N/A 2016    Procedure: Uterine Biopsy;  Surgeon: Anna Sanderson MD;  [facility];  Service:     LAPAROTOMY N/A 2016    Procedure: LAPAROTOMY EXPLORATORY, LYSIS OF Uterine ADHESIONS;  Surgeon: Allan Goss DO;  [facility];  Service:     LUNG LOBECTOMY             Consults have been placed to:   None    Vitals:    03/27/18 1545   BP: 142/82   BP Location: Right arm   Pulse: 85   Resp: 18   Temp: 98 4 °F (36 9 °C)   TempSrc: Temporal   SpO2: 100%   Weight: 40 kg (88 lb 2 9 oz)   Height: 4' (1 219 m)       Most recent labs:    Recent Labs      03/27/18   1604  03/28/18   0456   WBC  7 44  6 31   HGB  8 7*  8 5*   HCT  26 2*  26 0*   PLT  388  389   K  4 0  3 6   NA  136  139   CALCIUM  8 9  8 7   BUN  27*  17   CREATININE  1 06  1 04   INR  0 96   --    AST  26  22   ALT  21  14   ALKPHOS  59  63   BILITOT  0 20  0 31       Scheduled Meds:  Facility-Administered Medications Ordered in Other Encounters:  barium sulfate 450 mL Oral Once in imaging Rosemary Peoples MD   lisinopril 10 mg Oral Daily Zain Friedman DO   ondansetron 4 mg Intravenous Q6H PRN Zain Friedman DO   pantoprazole 40 mg Intravenous Q12H [facility] Zain Friedman DO     Continuous Infusions:  No current facility-administered medications for this encounter  PRN Meds:      Surgical procedures (if appropriate):

## 2018-03-28 NOTE — CASE MANAGEMENT
Initial Clinical Review    Admission: Date/Time/Statement: 3/27/18 @ 1514 Inpatient Written     Orders Placed This Encounter   Procedures    Inpatient Admission     Standing Status:   Standing     Number of Occurrences:   1     Order Specific Question:   Admitting Physician     Answer:   Tamiko Colmenares     Order Specific Question:   Level of Care     Answer:   Med Surg [16]     Order Specific Question:   Estimated length of stay     Answer:   More than 2 Midnights     Order Specific Question:   Certification     Answer:   I certify that inpatient services are medically necessary for this patient for a duration of greater than two midnights  See H&P and MD Progress Notes for additional information about the patient's course of treatment  ED: Date/Time/Mode of Arrival:   ED Arrival Information     Patient not seen in ED                     Chief Complaint: No chief complaint on file  ED Vital Signs:   ED Triage Vitals [03/27/18 1545]   Temperature Pulse Respirations Blood Pressure SpO2   98 4 °F (36 9 °C) 85 18 142/82 100 %      Temp Source Heart Rate Source Patient Position - Orthostatic VS BP Location FiO2 (%)   Temporal -- Lying Right arm --      Pain Score       --        Wt Readings from Last 1 Encounters:   03/27/18 43 7 kg (96 lb 6 4 oz)     Past Medical/Surgical History:    Active Ambulatory Problems     Diagnosis Date Noted    Small bowel obstruction 07/16/2016    Adenocarcinoma of lung ([facility] [address] ) 09/02/2014    Fatigue 07/17/2015    Hypercholesteremia 08/09/2012    Hypertension 05/18/2012    Anemia due to blood loss, acute 04/26/2017    Hyponatremia 07/18/2013    Thyroid nodule 03/15/2016    Weight loss 03/27/2018    Recurrent falls 03/27/2018     Resolved Ambulatory Problems     Diagnosis Date Noted    No Resolved Ambulatory Problems     Past Medical History:   Diagnosis Date    Cancer ([facility] [address] )     Hyperlipidemia     Hypertension     Hyponatremia     Small bowel obstruction Admitting Diagnosis: Anemia [D64 9]    Age/Sex: 80 y o  female      Grabiel Lucasdelia GOFF H&P COMPLETED  PT SEEN AT GI OFFICE AND THEN TRANSFERRED TO [facility] FROM [facility]

## 2018-03-28 NOTE — PHYSICAL THERAPY NOTE
Physical Therapy Evaluation      Patient Active Problem List   Diagnosis    Small bowel obstruction    [facility]    Fatigue    Hypercholesteremia    Hypertension    Anemia due to blood loss, acute    Hyponatremia    Thyroid nodule    Weight loss    Recurrent falls       Past Medical History:   Diagnosis Date    [facility]     lung    Hyperlipidemia     Hypertension     Hyponatremia     Small bowel obstruction     last assessed 16       Past Surgical History:   Procedure Laterality Date    CATARACT EXTRACTION       SECTION      CHEST WALL BIOPSY N/A 2016    Procedure: Uterine Biopsy;  Surgeon: Annabelle Bunn MD;  [facility];  Service:     LAPAROTOMY N/A 2016    Procedure: LAPAROTOMY EXPLORATORY, LYSIS OF Uterine ADHESIONS;  Surgeon: Jessica Martin DO;  [facility];  Service:     LUNG LOBECTOMY          18 1020   Note Type   Note type Eval only   Pain Assessment   Pain Assessment No/denies pain   Home Living   Type of [address] One level   Prior Function   Level of [place] Independent with ADLs and functional mobility   Lives With Alone   Receives Help From Family   ADL Assistance Independent   IADLs Independent   Falls in the last 6 months 0   Comments pt walks 2 miles daily   indep and active, no falls   General   Family/Caregiver Present No   Cognition   Overall Cognitive Status WFL   Orientation Level Oriented X4   RUE Assessment   RUE Assessment WFL   LUE Assessment   LUE Assessment WFL   RLE Assessment   RLE Assessment WFL   LLE Assessment   LLE Assessment WFL   Coordination   Movements are Fluid and Coordinated 1   Sensation WFL   Bed Mobility   Rolling R 7  Independent   Rolling L 7  Independent   Supine to Sit 7  Independent   Sit to Supine 7  Independent   Transfers   Sit to Stand 7  Independent   Stand to Sit 7  Independent   Ambulation/Elevation   Gait pattern WNL  (occasional widened base) Gait Assistance 5  Supervision   Assistive Device None   Distance 200'   Balance   Static Sitting Normal   Dynamic Sitting Normal   Static Standing Good   Dynamic Standing Fair +   Ambulatory Fair +   Endurance Deficit   Endurance Deficit No   Activity Tolerance   Activity Tolerance Patient tolerated treatment well   Nurse Made Aware yes   Assessment   Assessment pt admitted with anemia  refered to PT, being evaluated for malignacy  pt was indep PTA  pt lives alone, has supportive local daughter  pt demonstarted indep mobility without assistive device  pt did not have any light headedness, chest pain  and demonstrated only mild balance deficit affecting walking but is not unsafe  pt does not need skilled PT  pt can return home   pt can amb with nursing or restorative aide or nursing   Barriers to Discharge Decreased caregiver support   Goals   Patient Goals not be sick   Recommendation   Recommendation Home independently;Home with family support   PT - OK to Discharge Yes   Modified [place] Scale   Modified Dinah Scale 1   Barthel Index   Feeding 10   Bathing 5   Grooming Score 5   Dressing Score 10   Bladder Score 10   Bowels Score 10   Toilet Use Score 10   Transfers (Bed/Chair) Score 15   Mobility (Level Surface) Score 15   Stairs Score 0   Barthel Index Score 90   History: co - morbidities,   Exam: impairments in locomotion, balance, pulmonary  Clinical: unstable/unpredictable  Complexity: moderate      Tish Barahona, PT

## 2018-03-28 NOTE — PROGRESS NOTES
Progress Note - Hayden Gutierrez 1933, 80 y o  female MRN: [id number]    Unit/Bed#: -01 Encounter: [id number]    Primary Care Provider: Jn Francis PA-C   Date and time admitted to hospital: 3/27/2018  7:30 PM        * Anemia due to blood loss, acute   Assessment & Plan    · CT chest abdomen pelvis ordered, results pending  · Patient sent in by Gastroenterology up at [facility] for evaluation  Will eventually need tentative scope  Will continue with serial hemoglobin and hematocrit checks  Further plan will depend on CT scan results  Adenocarcinoma of lung [facility])   Assessment & Plan    · History of prior resection  Hypertension   Assessment & Plan    · Continue with anti hypertensive agents  Weight loss   Assessment & Plan    · Will follow up on CT scan chest abdomen pelvis  VTE Pharmacologic Prophylaxis:   Pharmacologic: Pharmacologic VTE Prophylaxis contraindicated due to Potential GI bleed  Mechanical VTE Prophylaxis in Place: No    Patient Centered Rounds: I have performed bedside rounds with nursing staff today  Discussions with Specialists or Other Care Team Provider:  Primary RN    Education and Discussions with Family / Patient:  Patient and daughter Rayma Net    Time Spent for Care: 20 minutes  More than 50% of total time spent on counseling and coordination of care as described above  Current Length of Stay: 1 day(s)    Current Patient Status: Inpatient   Certification Statement: The patient will continue to require additional inpatient hospital stay due to Acute blood loss anemia and workup  Discharge Plan / Estimated Discharge Date:  Not medically stable for discharge today      Code Status: Level 1 - Full Code      Subjective:   Feels well  No chest pain or shortness of breath  No nausea, vomiting, diarrhea, constipation  No bleeding  No dizziness      Objective:     Vitals:   Temp (24hrs), Av 1 °F (36 7 °C), Min:97 6 °F (36 4 °C), Max:98 5 °F (36 9 °C)    HR:  [66-91] 66  Resp:  [18] 18  BP: (123-142)/(57-82) 127/66  SpO2:  [98 %-100 %] 99 %  Body mass index is 29 42 kg/m²  Input and Output Summary (last 24 hours):     No intake or output data in the 24 hours ending 03/28/18 1117    Physical Exam:     Physical Exam   Constitutional: She is oriented to person, place, and time  She appears well-nourished  No distress  HENT:   Head: Normocephalic  Eyes: Pupils are equal, round, and reactive to light  Neck: Normal range of motion  Neck supple  Cardiovascular: Normal rate, regular rhythm and normal heart sounds  Pulmonary/Chest: Effort normal and breath sounds normal    Abdominal: Soft  Bowel sounds are normal    Musculoskeletal: Normal range of motion  Neurological: She is alert and oriented to person, place, and time  Skin: Skin is warm and dry  Psychiatric: She has a normal mood and affect  Her behavior is normal  Thought content normal    Nursing note and vitals reviewed  Additional Data:     Labs:      Results from last 7 days  Lab Units 03/28/18  0456   WBC Thousand/uL 6 31   HEMOGLOBIN g/dL 8 5*   HEMATOCRIT % 26 0*   PLATELETS Thousands/uL 389   NEUTROS PCT % 69   LYMPHS PCT % 19   MONOS PCT % 9   EOS PCT % 3       Results from last 7 days  Lab Units 03/28/18  0456   SODIUM mmol/L 139   POTASSIUM mmol/L 3 6   CHLORIDE mmol/L 106   CO2 mmol/L 26   BUN mg/dL 17   CREATININE mg/dL 1 04   CALCIUM mg/dL 8 7   TOTAL PROTEIN g/dL 7 3   BILIRUBIN TOTAL mg/dL 0 31   ALK PHOS U/L 63   ALT U/L 14   AST U/L 22   GLUCOSE RANDOM mg/dL 89       Results from last 7 days  Lab Units 03/27/18  1604   INR  0 96       * I Have Reviewed All Lab Data Listed Above        Imaging:    Imaging Reports Reviewed Today Include:  CT scan of the abdomen pelvis pending    Recent Cultures (last 7 days):           Last 24 Hours Medication List:     Current Facility-Administered Medications:  lisinopril 10 mg Oral Daily Zain Friedman DO ondansetron 4 mg Intravenous Q6H PRN Hetul Friedman, DO   pantoprazole 40 mg Intravenous Q12H [address] Hetul Friedman, DO        Today, Patient Was Seen By: JIMENEZ Reid    ** Please Note: Dragon 360 Dictation voice to text software may have been used in the creation of this document   **

## 2018-03-28 NOTE — CONSULTS
Consultation - [facility]  Samantha Normaneddie Matt 80 y o  female MRN: [id number]  Unit/Bed#: -01 Encounter: [id number]        ASSESSMENT/PLAN:   Anemia  Wt loss  Poor appetite    Pt was admitted after being seen in the office yesterday  She is a poor historian  Currently anemia, wt loss, poor appetite  Her iron was checked in Feb & was slightly decreased 48  She denies previous EGD/colon  She has a hx of lung CA  CT chest/abd/pelvis is pending  No signs of overt GI bleeding  She could have a recurrence w/ mets vs PUD/ GI malignancy  She does need an EGD/ colonoscopy for further eval  We'll await the results of the CT scan & then plan accordingly   -Follow H&H  -Follow-up on CT  -EGD/colon in future      Inpatient consult to gastroenterology  Consult performed by: Joyce Poster ordered by: Mark Jj          Reason for Consult / Principal Problem: Anemia    HPI: Briana Brian is a 80y o  year old female with a PMH of lung CA s/p resection 3-4 yrs ago who was seen in the office yesterday for anemia  Pt is confused & a porr historian & therefore most hx is from the chart  She reportedly has had a decrease in her Hbg in the last 1-2 mons  On admission it is 8 5 & was 10 in Feb  She states she had pain in her chest but denies abd pain  She states she had nausea but not vomiting  She states her stools were dark but have since returned to normal  According to her chart she has had a poor appetite & ~15lb wt loss  She denies previous EGD or colonoscopy  Review of Systems: as per HPI  Review of Systems   All other systems reviewed and are negative        Historical Information   Past Medical History:   Diagnosis Date    [facility]     lung    Hyperlipidemia     Hypertension     Hyponatremia     Small bowel obstruction     last assessed 16     Past Surgical History:   Procedure Laterality Date    CATARACT EXTRACTION       SECTION      CHEST WALL BIOPSY N/A 2016 Procedure: Uterine Biopsy;  Surgeon: Amber Webster MD;  [facility];  Service:     LAPAROTOMY N/A 7/16/2016    Procedure: LAPAROTOMY EXPLORATORY, LYSIS OF Uterine ADHESIONS;  Surgeon: Nahed Mac DO;  [facility];  Service:     LUNG LOBECTOMY       Social History   History   Alcohol Use No     History   Drug Use No     History   Smoking Status    Never Smoker   Smokeless Tobacco    Never Used     Family History   Problem Relation Age of Onset    No Known Problems Family        Meds/Allergies     Prescriptions Prior to Admission   Medication    hydrochlorothiazide (MICROZIDE) 12 5 mg capsule    lisinopril (ZESTRIL) 10 mg tablet     Current Facility-Administered Medications   Medication Dose Route Frequency    lisinopril (ZESTRIL) tablet 10 mg  10 mg Oral Daily    ondansetron (ZOFRAN) injection 4 mg  4 mg Intravenous Q6H PRN    pantoprazole (PROTONIX) injection 40 mg  40 mg Intravenous Q12H [address]       No Known Allergies    Objective     Blood pressure 127/66, pulse 66, temperature 97 6 °F (36 4 °C), temperature source Oral, resp  rate 18, height 4' (1 219 m), weight 43 7 kg (96 lb 6 4 oz), SpO2 99 %  No intake or output data in the 24 hours ending 03/28/18 1029    PHYSICAL EXAM     Physical Exam   Constitutional: She is oriented to person, place, and time  Thin & frail   HENT:   Head: Normocephalic and atraumatic  Eyes: Conjunctivae are normal    Neck: Normal range of motion  Cardiovascular: Normal rate and regular rhythm  Pulmonary/Chest: Effort normal and breath sounds normal    Abdominal: Soft  Bowel sounds are normal  She exhibits no distension  There is no tenderness  Musculoskeletal: Normal range of motion  Neurological: She is alert and oriented to person, place, and time  Skin: Skin is warm and dry  Psychiatric: She has a normal mood and affect   Her behavior is normal        Lab Results:   CBC:   Lab Results   Component Value Date    WBC 6 31 03/28/2018 HGB 8 5 (L) 03/28/2018    HCT 26 0 (L) 03/28/2018    MCV 88 03/28/2018     03/28/2018    MCH 28 8 03/28/2018    MCHC 32 7 03/28/2018    RDW 13 4 03/28/2018    MPV 9 0 03/28/2018    NRBC 0 03/28/2018   ,   CMP:   Lab Results   Component Value Date     03/28/2018    K 3 6 03/28/2018     03/28/2018    CO2 26 03/28/2018    ANIONGAP 7 03/28/2018    BUN 17 03/28/2018    CREATININE 1 04 03/28/2018    GLUCOSE 89 03/28/2018    CALCIUM 8 7 03/28/2018    AST 22 03/28/2018    ALT 14 03/28/2018    ALKPHOS 63 03/28/2018    PROT 7 3 03/28/2018    BILITOT 0 31 03/28/2018    EGFR 49 03/28/2018   ,   Lipase: No results found for: LIPASE,  PT/INR:   Lab Results   Component Value Date    INR 0 96 03/27/2018   ,   Troponin: No results found for: TROPONINI,   Magnesium: No results found for: MAG,   Phosphorous:   Lab Results   Component Value Date    PHOS 3 2 03/28/2018     Imaging Studies:     CT chest/abd/pelvis: pending

## 2018-03-28 NOTE — CASE MANAGEMENT
THIS IS A MEDICARE READMISSION  PRIOR [facility] BRIEF ADMISSION 3/27/18 - 3/27/18  *CASE TASKED TO PA FOR MEDICARE READMISSION + 2ND LEVEL REVIEW      Initial Clinical Review    Admission: Date/Time/Statement:   3/27/18 AT 1931 DIRECT INPATIENT ADMISSION  TO [facility] 2ND ANEMIA + WEIGHT LOSS PER GASTROENTEROLOGY AFTER SEEN AT [facility] ED     Orders Placed This Encounter   Procedures    Inpatient Admission     Standing Status:   Standing     Number of Occurrences:   1     Order Specific Question:   Admitting Physician     Answer:   Jaquelin Baron     Order Specific Question:   Level of Care     Answer:   Med Surg [16]     Order Specific Question:   Estimated length of stay     Answer:   More than 2 Midnights     Order Specific Question:   Certification     Answer:   I certify that inpatient services are medically necessary for this patient for a duration of greater than two midnights  See H&P and MD Progress Notes for additional information about the patient's course of treatment  Date/Time/Mode of Arrival:   3/27/18 AT 1931 DIRECT INPATIENT ADMISSION  TO [facility] 2ND ANEMIA + WEIGHT LOSS PER GASTROENTEROLOGY  AFTER SEEN AT [facility] ED         Chief Complaint:   Patient presents as a direct admission from the [facility] for anemia     History of Present Illness:   Arta Hatchet is a 80 y o  female who presents with an evaluation for anemia  Patient was sent from [facility] initially as an admission to [address] [place] [address] from the GI service  She was being followed by Gastroenterology as an outpatient at [address] [place] [address]  She was noted to have a drop in her hemoglobin concerning for possible cold blood loss with suspicion for either malignant process, AVM or an advanced adenoma  Due to her change in mental status and recurrent falls with associated weakness she was sent in for further evaluation with plans for EGD and colonoscopy at [facility] initially    The patient was also noted to have weight loss concerning for underlying malignant process and was sent in for CT imaging  She has a known history of prior stage IB lung cancer status post right lower lobe lobectomy by history  Upon initial assessment at [facility] the daughter had elected to have the patient transition to [facility] as a direct admission for further gastroenterology evaluation  The patient was not formally admitted to [facility] and she was brought in by family for further workup evaluation at [facility] in [place]  She presents to the hospital with reported symptoms of weight loss at least 15 to 20 lb over the last month  She also notes increasing symptoms of early satiety and dark stools  She was sent to the hospital for further workup evaluation by Dr Cal Kiser       Review of Systems:  Constitutional: Negative for activity change and chills  Respiratory: Negative for cough, choking and chest tightness  Cardiovascular: Negative for chest pain  Gastrointestinal: Negative for abdominal distention  Genitourinary: Negative for dyspareunia and flank pain  Musculoskeletal: Negative for arthralgias  Neurological: Negative for dizziness, facial asymmetry and numbness  All other systems reviewed and are negative        ED Vital Signs:   ED Triage Vitals   Temperature Pulse Respirations Blood Pressure SpO2   03/27/18 1950 03/27/18 1950 03/27/18 1950 03/27/18 1950 03/27/18 1950   98 5 °F (36 9 °C) 91 18 124/57 99 %      Temp Source Heart Rate Source Patient Position - Orthostatic VS BP Location FiO2 (%)   03/27/18 1950 03/27/18 2340 03/27/18 1950 03/27/18 1950 --   Oral Monitor Lying Left arm       Pain Score       03/27/18 1950       No Pain        Wt Readings from Last 1 Encounters:   03/27/18 43 7 kg (96 lb 6 4 oz)       Vital Signs (abnormal):       LABS/Diagnostic Test Results:  Results from last 7 days  Lab Units 03/27/18  1604   WBC Thousand/uL 7 44   HEMOGLOBIN g/dL 8 7*   HEMATOCRIT % 26 2*   PLATELETS Thousands/uL 388   NEUTROS PCT % 75   LYMPHS PCT % 14   MONOS PCT % 9   EOS PCT % 2       Results from last 7 days  Lab Units 03/27/18  1604   SODIUM mmol/L 136   POTASSIUM mmol/L 4 0   CHLORIDE mmol/L 100   CO2 mmol/L 27   BUN mg/dL 27*   CREATININE mg/dL 1 06   CALCIUM mg/dL 8 9   TOTAL PROTEIN g/dL 7 3   BILIRUBIN TOTAL mg/dL 0 20   ALK PHOS U/L 59   ALT U/L 21   AST U/L 26   GLUCOSE RANDOM mg/dL 92       Results from last 7 days  Lab Units 03/27/18  1604   INR   0 96       CT HEAD -  Stable exam   No acute intracranial abnormality  Past Medical/Surgical History: Active Ambulatory Problems     Diagnosis Date Noted    Small bowel obstruction 07/16/2016    [facility]    Fatigue 07/17/2015    Hypercholesteremia 08/09/2012    Hypertension 05/18/2012    Anemia due to blood loss, acute 04/26/2017    Hyponatremia 07/18/2013    Thyroid nodule 03/15/2016    Weight loss 03/27/2018    Recurrent falls 03/27/2018     Resolved Ambulatory Problems     Diagnosis Date Noted    No Resolved Ambulatory Problems     Past Medical History:   Diagnosis Date    Cancer ([place] [address] )     Hyperlipidemia     Hypertension     Hyponatremia     Small bowel obstruction        Admitting Diagnosis: Anemia [D64 9]  Weight loss [R63 4]    Age/Sex: 80 y o  female     Assessment/Plan:      Anemia   Assessment & Plan     CT chest abdomen pelvis as above  Patient sent in by Gastroenterology up at [facility] for evaluation  Will eventually need tentative scope  Will continue with serial hemoglobin and hematocrit checks    Consult Gastroenterology           Weight loss   Assessment & Plan     Will follow up on CT scan chest abdomen pelvis              Hypertension   Assessment & Plan     Continue with anti hypertensive agents           [facility] )   Assessment & Plan     History of prior resection               VTE Prophylaxis: Will hold off on anticoagulation given concerns for possible culture blood loss     Given patient's elevated BUN to creatinine ratio will compared to cover with PPI for the time being  Will have GI workup further  Will make patient NPO after midnight   / sequential compression device     Code Status:  Full code    Anticipated Length of Stay:  Patient will be admitted on an Inpatient basis with an anticipated length of stay of  greater than 2 midnights  Justification for Hospital Stay:  Needs further evaluation of anemia and possible malignant process          Admission Orders:  3/27/18 AT 1931  ADMIT INPATIENT  VS Q4HRS    Up as Tolerated  SCD    Regular House Diet  (NPO 3/28/18)     IV ACCESS      Scheduled Meds:   Current Facility-Administered Medications:  barium sulfate 450 mL Oral Once in imaging Maggie Szymanski MD   lisinopril 10 mg Oral Daily Hetul Friedman, DO   ondansetron 4 mg Intravenous Q6H PRN Hetul Friedman, DO   pantoprazole 40 mg Intravenous Q12H [address] Hetul Friedman, DO        PRN Meds:     barium sulfate    ondansetron    CONSULT GI    REPEAT CBC IN AM         Gastroenterology  Consults Date of Service: 3/28/2018 10:29 AM      ASSESSMENT/PLAN:   Anemia  Wt loss  Poor appetite     Pt was admitted after being seen in the office yesterday  She is a poor historian  Currently anemia, wt loss, poor appetite  Her iron was checked in Feb & was slightly decreased 48  She denies previous EGD/colon  She has a hx of lung CA  CT chest/abd/pelvis is pending  No signs of overt GI bleeding  She could have a recurrence w/ mets vs PUD/ GI malignancy   She does need an EGD/ colonoscopy for further eval  We'll await the results of the CT scan & then plan accordingly   -Follow H&H  -Follow-up on CT  -EGD/colon in future

## 2018-03-28 NOTE — ASSESSMENT & PLAN NOTE
· CT chest abdomen pelvis ordered, results pending  · Patient sent in by Gastroenterology up at [facility] for evaluation  Will eventually need tentative scope  Will continue with serial hemoglobin and hematocrit checks  Further plan will depend on CT scan results

## 2018-03-28 NOTE — OCCUPATIONAL THERAPY NOTE
Occupational Therapy Screen    Orders received  Chart review completed  Spoke w/ PT Loan Cannon who reports the pt is independent w/ all transfers and mobility  PT reports pt walks 2 miles a day  Met w/ pt who reports independent w/ all ADLS and IADLS PTA and performing ADLS independently while in the hospital  Educated pt regarding OT services  Pt does not require acute care OT services  Anticipate D/C home when medically stable  Will sign off  Please re-consult if changes occur  D/C OT         Mago Zendejas MS, OTR/L

## 2018-03-28 NOTE — SOCIAL WORK
CM met with patient and explained cm role  Pt alert and oriented  Pt reports she lives in an apartment alone w/20 steps to apt, once in everything on one level  Pt denies DME, VNA, and rehab  Pt reports being independent PTA, reports good support from family and friends, she does not drive, reports she rides a bike and walks everywhere, pts daughter Ángela Boston [phone] will transport for d/c  Pts pharmacy is [facility] in [place]  No POA  Pt denies hx/admission for drugs/etoh and psych/mental health  PCP is Briseyda Han  CM reviewed d/c planning process including the following: identifying help at home, patient preference for d/c planning needs, Discharge Lounge, [facility] Meds to Bed program, availability of treatment team to discuss questions or concerns patient and/or family may have regarding understanding medications and recognizing signs and symptoms once discharged  CM also encouraged patient to follow up with all recommended appointments after discharge  Patient advised of importance for patient and family to participate in managing patients medical well being

## 2018-03-28 NOTE — PHYSICIAN ADVISOR
Current patient class: Inpatient  The patient is currently on Hospital Day: 2      The patient was admitted to the hospital at [address] [place] [address] on 3/27/18 for the following diagnosis:  Anemia [D64 9]  Weight loss [R63 4]       There is documentation in the medical record of an expected length of stay of at least 2 midnights  The patient is therefore expected to satisfy the 2 midnight benchmark and given the 2 midnight presumption is appropriate for INPATIENT ADMISSION  Given this expectation of a satisfying stay, CMS instructs us that the patient is most often appropriate for inpatient admission under part A provided medical necessity is documented in the chart  After review of the relevant documentation, labs, vital signs and test results, the patient is appropriate for INPATIENT ADMISSION  Admission to the hospital as an inpatient is a complex decision making process which requires the practitioner to consider the patients presenting complaint, history and physical examination and all relevant testing  With this in mind, in this case, the patient was deemed appropriate for INPATIENT ADMISSION  After review of the documentation and testing available at the time of the admission I concur with this clinical determination of medical necessity  Rationale is as follows: The patient is an 70-year-old female who was directly [facility] [place] on March 27, 2018  She was noted to have decrease in hemoglobin, recurrent falls, mental status change, and weakness  She also had weight loss concerning for underlying malignant process  History includes lung cancer  Symptoms also include early satiety and dark stool  Hemoglobin is 8 7  The patient is admitted to the [facility], as an inpatient anticipating a greater than two midnight hospitalization      She will undergo CT of the chest, abdomen, and pelvis with contrast   Patient has been evaluated by Gastroenterology and is scheduled for EGD and colonoscopy  It is presumed that her anemia is due to acute gastrointestinal blood loss  Hemoglobin remains low at 8 5  Inpatient level of care remains appropriate  The patient originally presented to [address] [place] [address] and an IP order was placed (3/27/18)  The decision was made to instead have her directly admitted to [place]  This was not a transfer  Inpatient status was not the appropriate level of care at [facility]  The patient was discharged from the Emergency Department without the use of an inpatient bed  The patient did not utilize inpatient resources and did not satisfy the 2 midnight benchmark  As such the patient is appropriate for OUTPATIENT STATUS  The patient has been discharged prior to this review  The patient is therefore a provider liable case as the status cannot be changed at this point       The patients vitals on arrival were ED Triage Vitals   Temperature Pulse Respirations Blood Pressure SpO2   18   98 5 °F (36 9 °C) 91 18 124/57 99 %      Temp Source Heart Rate Source Patient Position - Orthostatic VS BP Location FiO2 (%)   18 2340 18 --   Oral Monitor Lying Left arm       Pain Score       18       No Pain           Past Medical History:   Diagnosis Date    Cancer ([address] )     lung    Hyperlipidemia     Hypertension     Hyponatremia     Small bowel obstruction     last assessed 16     Past Surgical History:   Procedure Laterality Date    CATARACT EXTRACTION       SECTION      CHEST WALL BIOPSY N/A 2016    Procedure: Uterine Biopsy;  Surgeon: Shazia Van MD;  [facility]: [facility];  Service:     LAPAROTOMY N/A 2016    Procedure: LAPAROTOMY EXPLORATORY, LYSIS OF Uterine ADHESIONS;  Surgeon: Rc Rivas DO;  Location: [facility];  Service:     LUNG LOBECTOMY             Consults have been placed to:   IP CONSULT TO GASTROENTEROLOGY    Vitals:    03/27/18 1950 03/27/18 2340 03/28/18 0724 03/28/18 1501   BP: 124/57 133/65 127/66 115/70   BP Location: Left arm Left arm Left arm Right arm   Pulse: 91 77 66 82   Resp: 18 18 18 18   Temp: 98 5 °F (36 9 °C) 98 5 °F (36 9 °C) 97 6 °F (36 4 °C) 98 8 °F (37 1 °C)   TempSrc: Oral Oral Oral Oral   SpO2: 99% 98% 99% 99%   Weight: 43 7 kg (96 lb 6 4 oz)      Height: 4' (1 219 m)          Most recent labs:    Recent Labs      03/27/18   1604  03/28/18   0456   WBC  7 44  6 31   HGB  8 7*  8 5*   HCT  26 2*  26 0*   PLT  388  389   K  4 0  3 6   NA  136  139   CALCIUM  8 9  8 7   BUN  27*  17   CREATININE  1 06  1 04   INR  0 96   --    AST  26  22   ALT  21  14   ALKPHOS  59  63   BILITOT  0 20  0 31       Scheduled Meds:  Current Facility-Administered Medications:  barium sulfate 450 mL Oral Once in imaging Ronny Mcburney, MD   lisinopril 10 mg Oral Daily Hetul Friedman, DO   ondansetron 4 mg Intravenous Q6H PRN Zain Friedman, DO   pantoprazole 40 mg Intravenous Q12H [address] Hetul Friedman, DO     Continuous Infusions:   PRN Meds:   barium sulfate    ondansetron    Surgical procedures (if appropriate):

## 2018-03-29 PROBLEM — D64.9 ANEMIA: Status: ACTIVE | Noted: 2018-03-27

## 2018-03-29 PROCEDURE — C9113 INJ PANTOPRAZOLE SODIUM, VIA: HCPCS | Performed by: INTERNAL MEDICINE

## 2018-03-29 PROCEDURE — 99232 SBSQ HOSP IP/OBS MODERATE 35: CPT | Performed by: NURSE PRACTITIONER

## 2018-03-29 RX ADMIN — PANTOPRAZOLE SODIUM 40 MG: 40 INJECTION, POWDER, FOR SOLUTION INTRAVENOUS at 08:13

## 2018-03-29 RX ADMIN — LISINOPRIL 10 MG: 10 TABLET ORAL at 08:13

## 2018-03-29 RX ADMIN — PANTOPRAZOLE SODIUM 40 MG: 40 INJECTION, POWDER, FOR SOLUTION INTRAVENOUS at 20:44

## 2018-03-29 RX ADMIN — POLYETHYLENE GLYCOL 3350, SODIUM SULFATE ANHYDROUS, SODIUM BICARBONATE, SODIUM CHLORIDE, POTASSIUM CHLORIDE 4000 ML: 236; 22.74; 6.74; 5.86; 2.97 POWDER, FOR SOLUTION ORAL at 16:16

## 2018-03-29 NOTE — ASSESSMENT & PLAN NOTE
· CT chest abdomen pelvis without acute pathology  · Patient sent in by Gastroenterology up at [facility] for evaluation  Will get EGD and colono tomorrow  Will continue with serial hemoglobin and hematocrit checks- have been stable so far

## 2018-03-29 NOTE — PROGRESS NOTES
Progress Note - Frankie Gutierrez 1933, 80 y o  female MRN: [id number]    Unit/Bed#: -01 Encounter: [id number]    Primary Care Provider: Jelena Downing PA-C   Date and time admitted to hospital: 3/27/2018  7:30 PM        * Anemia due to blood loss, acute   Assessment & Plan    · CT chest abdomen pelvis without acute pathology  · Patient sent in by Gastroenterology up at [facility] for evaluation  Will get EGD and colono tomorrow  Will continue with serial hemoglobin and hematocrit checks- have been stable so far  Adenocarcinoma of lung [facility])   Assessment & Plan    · History of prior resection  · No recurrence on recent CT scan        Hypertension   Assessment & Plan    · Continue with anti hypertensive agents  Weight loss   Assessment & Plan    · Encourage proper nutrition             VTE Pharmacologic Prophylaxis:   Pharmacologic: Pharmacologic VTE Prophylaxis contraindicated due to Possible GI bleed  Mechanical VTE Prophylaxis in Place: Yes    Patient Centered Rounds: I have performed bedside rounds with nursing staff today  Discussions with Specialists or Other Care Team Provider:  Primary RN    Education and Discussions with Family / Patient:  Updated daughter Filemon Carrillo over the phone    Time Spent for Care: 20 minutes  More than 50% of total time spent on counseling and coordination of care as described above  Current Length of Stay: 2 day(s)    Current Patient Status: Inpatient   Certification Statement: The patient will continue to require additional inpatient hospital stay due to Possible GI bleed    Discharge Plan / Estimated Discharge Date:  Possibly tomorrow      Code Status: Level 1 - Full Code      Subjective:   Offers no complaints of chest pain or shortness of breath  No bleeding  No nausea vomiting diarrhea  Having her scopes tomorrow and hopes that she can go home afterwards      Objective:     Vitals:   Temp (24hrs), Av 2 °F (36 8 °C), Min:97 6 °F (36 4 °C), Max:98 8 °F (37 1 °C)    HR:  [82-89] 89  Resp:  [18] 18  BP: (105-115)/(56-70) 115/56  SpO2:  [98 %-100 %] 100 %  Body mass index is 29 42 kg/m²  Input and Output Summary (last 24 hours): Intake/Output Summary (Last 24 hours) at 03/29/18 1207  Last data filed at 03/29/18 1151   Gross per 24 hour   Intake              690 ml   Output              200 ml   Net              490 ml       Physical Exam:     Physical Exam   Constitutional: She is oriented to person, place, and time  She appears well-nourished  No distress  HENT:   Head: Normocephalic and atraumatic  Eyes: Pupils are equal, round, and reactive to light  Neck: Normal range of motion  Cardiovascular: Normal rate, regular rhythm and normal heart sounds  No murmur heard  Pulmonary/Chest: Breath sounds normal    Abdominal: Soft  Bowel sounds are normal    Musculoskeletal: Normal range of motion  Neurological: She is alert and oriented to person, place, and time  Skin: Skin is warm and dry  Psychiatric: She has a normal mood and affect  Her behavior is normal  Thought content normal    Nursing note and vitals reviewed  Additional Data:     Labs:      Results from last 7 days  Lab Units 03/28/18  0456   WBC Thousand/uL 6 31   HEMOGLOBIN g/dL 8 5*   HEMATOCRIT % 26 0*   PLATELETS Thousands/uL 389   NEUTROS PCT % 69   LYMPHS PCT % 19   MONOS PCT % 9   EOS PCT % 3       Results from last 7 days  Lab Units 03/28/18  0456   SODIUM mmol/L 139   POTASSIUM mmol/L 3 6   CHLORIDE mmol/L 106   CO2 mmol/L 26   BUN mg/dL 17   CREATININE mg/dL 1 04   CALCIUM mg/dL 8 7   TOTAL PROTEIN g/dL 7 3   BILIRUBIN TOTAL mg/dL 0 31   ALK PHOS U/L 63   ALT U/L 14   AST U/L 22   GLUCOSE RANDOM mg/dL 89       Results from last 7 days  Lab Units 03/27/18  1604   INR  0 96       * I Have Reviewed All Lab Data Listed Above    Reviewed CT scan of the abdomen and pelvis    Recent Cultures (last 7 days):           Last 24 Hours Medication List: Current Facility-Administered Medications:  barium sulfate 450 mL Oral Once in imaging Jef Day MD   lisinopril 10 mg Oral Daily Hetul Friedman, DO   ondansetron 4 mg Intravenous Q6H PRN Hetul Friedman, DO   pantoprazole 40 mg Intravenous Q12H [address] Hetul Friedman, DO   polyethylene glycol 4,000 mL Oral Once Ed KRISSY Coelho        Today, Patient Was Seen By: JIMENEZ Rob    ** Please Note: Dragon 360 Dictation voice to text software may have been used in the creation of this document   **

## 2018-03-29 NOTE — PROGRESS NOTES
03/29/18 Renetta Arenasor 69    Spiritual Beliefs/Perceptions   Concept of God Accepting   Coping Responses   Patient Coping Open/discussion   Plan of Care   Comments PT provided life review abd spoke of life in [place]   Assessment Completed by: Unit visit

## 2018-03-30 ENCOUNTER — ANESTHESIA (INPATIENT)
Dept: GASTROENTEROLOGY | Facility: HOSPITAL | Age: 83
DRG: 811 | End: 2018-03-30
Payer: MEDICARE

## 2018-03-30 ENCOUNTER — ANESTHESIA EVENT (INPATIENT)
Dept: GASTROENTEROLOGY | Facility: HOSPITAL | Age: 83
DRG: 811 | End: 2018-03-30
Payer: MEDICARE

## 2018-03-30 VITALS
DIASTOLIC BLOOD PRESSURE: 58 MMHG | RESPIRATION RATE: 18 BRPM | SYSTOLIC BLOOD PRESSURE: 117 MMHG | BODY MASS INDEX: 19.8 KG/M2 | OXYGEN SATURATION: 93 % | WEIGHT: 88 LBS | TEMPERATURE: 98.3 F | HEART RATE: 93 BPM | HEIGHT: 56 IN

## 2018-03-30 PROBLEM — E43 SEVERE PROTEIN-CALORIE MALNUTRITION (GOMEZ: LESS THAN 60% OF STANDARD WEIGHT) (HCC): Status: ACTIVE | Noted: 2018-03-30

## 2018-03-30 PROCEDURE — 99239 HOSP IP/OBS DSCHRG MGMT >30: CPT | Performed by: NURSE PRACTITIONER

## 2018-03-30 PROCEDURE — 0DJD8ZZ INSPECTION OF LOWER INTESTINAL TRACT, VIA NATURAL OR ARTIFICIAL OPENING ENDOSCOPIC: ICD-10-PCS | Performed by: INTERNAL MEDICINE

## 2018-03-30 PROCEDURE — C9113 INJ PANTOPRAZOLE SODIUM, VIA: HCPCS | Performed by: INTERNAL MEDICINE

## 2018-03-30 PROCEDURE — 88342 IMHCHEM/IMCYTCHM 1ST ANTB: CPT | Performed by: PATHOLOGY

## 2018-03-30 PROCEDURE — 0DB98ZX EXCISION OF DUODENUM, VIA NATURAL OR ARTIFICIAL OPENING ENDOSCOPIC, DIAGNOSTIC: ICD-10-PCS | Performed by: INTERNAL MEDICINE

## 2018-03-30 PROCEDURE — 0DB68ZX EXCISION OF STOMACH, VIA NATURAL OR ARTIFICIAL OPENING ENDOSCOPIC, DIAGNOSTIC: ICD-10-PCS | Performed by: INTERNAL MEDICINE

## 2018-03-30 PROCEDURE — 88305 TISSUE EXAM BY PATHOLOGIST: CPT | Performed by: PATHOLOGY

## 2018-03-30 RX ORDER — LIDOCAINE HYDROCHLORIDE 10 MG/ML
INJECTION, SOLUTION INFILTRATION; PERINEURAL AS NEEDED
Status: DISCONTINUED | OUTPATIENT
Start: 2018-03-30 | End: 2018-03-30 | Stop reason: SURG

## 2018-03-30 RX ORDER — PROPOFOL 10 MG/ML
INJECTION, EMULSION INTRAVENOUS CONTINUOUS PRN
Status: DISCONTINUED | OUTPATIENT
Start: 2018-03-30 | End: 2018-03-30 | Stop reason: SURG

## 2018-03-30 RX ORDER — SODIUM CHLORIDE 9 MG/ML
INJECTION, SOLUTION INTRAVENOUS CONTINUOUS PRN
Status: DISCONTINUED | OUTPATIENT
Start: 2018-03-30 | End: 2018-03-30 | Stop reason: SURG

## 2018-03-30 RX ORDER — PROPOFOL 10 MG/ML
INJECTION, EMULSION INTRAVENOUS AS NEEDED
Status: DISCONTINUED | OUTPATIENT
Start: 2018-03-30 | End: 2018-03-30 | Stop reason: SURG

## 2018-03-30 RX ORDER — SODIUM CHLORIDE 9 MG/ML
50 INJECTION, SOLUTION INTRAVENOUS CONTINUOUS
Status: CANCELLED | OUTPATIENT
Start: 2018-03-30

## 2018-03-30 RX ADMIN — PROPOFOL 70 MCG/KG/MIN: 10 INJECTION, EMULSION INTRAVENOUS at 12:26

## 2018-03-30 RX ADMIN — PROPOFOL 20 MG: 10 INJECTION, EMULSION INTRAVENOUS at 12:40

## 2018-03-30 RX ADMIN — PANTOPRAZOLE SODIUM 40 MG: 40 INJECTION, POWDER, FOR SOLUTION INTRAVENOUS at 09:38

## 2018-03-30 RX ADMIN — PROPOFOL 50 MG: 10 INJECTION, EMULSION INTRAVENOUS at 12:26

## 2018-03-30 RX ADMIN — LIDOCAINE HYDROCHLORIDE 50 MG: 10 INJECTION, SOLUTION INFILTRATION; PERINEURAL at 12:26

## 2018-03-30 RX ADMIN — LISINOPRIL 10 MG: 10 TABLET ORAL at 09:37

## 2018-03-30 RX ADMIN — SODIUM CHLORIDE: 0.9 INJECTION, SOLUTION INTRAVENOUS at 12:19

## 2018-03-30 NOTE — ANESTHESIA PREPROCEDURE EVALUATION
Review of Systems/Medical History  Patient summary reviewed  Chart reviewed      Cardiovascular  EKG reviewed, Exercise tolerance: poor,  Hyperlipidemia, Hypertension ,    Pulmonary       GI/Hepatic         Comment: Weight loss 15-20 lb/last month     Endo/Other     GYN       Hematology  Anemia anemia of chronic disease,     Musculoskeletal       Neurology   Psychology         Lab Results   Component Value Date    WBC 6 31 03/28/2018    HGB 8 5 (L) 03/28/2018    HCT 26 0 (L) 03/28/2018    MCV 88 03/28/2018     03/28/2018     Lab Results   Component Value Date    INR 0 96 03/27/2018    INR 1 05 08/08/2014    INR 0 95 03/10/2014    PROTIME 12 7 03/27/2018    PROTIME 13 2 08/08/2014    PROTIME 12 2 03/10/2014     Lab Results   Component Value Date    GLUCOSE 89 03/28/2018    CALCIUM 8 7 03/28/2018     03/28/2018    K 3 6 03/28/2018    CO2 26 03/28/2018     03/28/2018    BUN 17 03/28/2018    CREATININE 1 04 03/28/2018     Lab Results   Component Value Date    HGBA1C 5 0 12/08/2015     3/10/2014    LEFT VENTRICLE: Size was normal  Systolic function was normal by visual   assessment  Ejection fraction was estimated to be 65 %  There were no regional   wall motion abnormalities  Wall thickness was normal  DOPPLER: Doppler   parameters were consistent with abnormal left ventricular relaxation (grade 1   diastolic dysfunction)  Doppler parameters were consistent with high   ventricular filling pressure  RIGHT VENTRICLE: The size was normal  Systolic function was normal  Wall   thickness was normal    LEFT ATRIUM: Size was normal    RIGHT ATRIUM: Size was normal    MITRAL VALVE: Valve structure was normal  There was normal leaflet separation  DOPPLER: The transmitral velocity was within the normal range  There was no   evidence for stenosis  There was no significant regurgitation  AORTIC VALVE: The valve was trileaflet   Leaflets exhibited mildly increased   thickness, mild calcification, and mildly reduced cuspal separation  DOPPLER:   Transaortic velocity was within the normal range  There was mild stenosis  There was no significant regurgitation  TRICUSPID VALVE: The valve structure was normal  There was normal leaflet   separation  DOPPLER: The transtricuspid velocity was within the normal range  There was no evidence for stenosis  There was no significant regurgitation  PULMONIC VALVE: Leaflets exhibited normal thickness, no calcification, and   normal cuspal separation  DOPPLER: The transpulmonic velocity was within the   normal range  There was no significant regurgitation  PERICARDIUM: There was no pericardial effusion  The pericardium was normal in   appearance  AORTA: The root exhibited normal size  SYSTEMIC VEINS: IVC: The inferior vena cava was normal in size  Respirophasic   changes were normal    Physical Exam    Airway    Mallampati score: II  TM Distance: >3 FB  Neck ROM: full     Dental   No notable dental hx     Cardiovascular  Rhythm: regular, Rate: normal,     Pulmonary  Pulmonary exam normal Breath sounds clear to auscultation,     Other Findings        Anesthesia Plan  ASA Score- 2     Anesthesia Type- IV sedation with anesthesia with ASA Monitors  Additional Monitors:   Airway Plan:         Plan Factors-Patient not instructed to abstain from smoking on day of procedure  Patient did not smoke on day of surgery  Induction- intravenous  Postoperative Plan-     Informed Consent- Anesthetic plan and risks discussed with patient  I personally reviewed this patient with the CRNA  Discussed and agreed on the Anesthesia Plan with the CRNA  Georgette Ormond

## 2018-03-30 NOTE — ASSESSMENT & PLAN NOTE
· CT chest abdomen pelvis without acute pathology  · Patient sent in by Gastroenterology up at [facility] for evaluation  Will get EGD and colono today    · H/H stable

## 2018-03-30 NOTE — OP NOTE
OPERATIVE REPORT  PATIENT NAME: Cici Charles    :  1933  MRN: [id number]  Pt Location: [facility] GI ROOM 03    SURGERY DATE: 3/30/2018    Surgeon(s) and Role:     Edith Perez MD - Primary    Preop Diagnosis:  Anemia [D64 9]    Post-Op Diagnosis Codes:     * Anemia [D64 9]    Procedure(s) (LRB):  EGD AND COLONOSCOPY (N/A)    Specimen(s):  ID Type Source Tests Collected by Time Destination   1 : cold bx, r/o celiac Tissue Duodenum TISSUE EXAM Chano Davalos MD 3/30/2018 1229    2 : cold bx, r/o H  Pylori  Tissue Stomach TISSUE EXAM Chano Davalos MD 3/30/2018 1229        ESOPHAGOGASTRODUODENOSCOPY    PROCEDURE: EGD    SEDATION: Monitored anesthesia care, check anesthesia records    ASA Class: 3    INDICATIONS: weight loss, anemia    CONSENT:  Informed consent was obtained for the procedure, including sedation after explaining the risks and benefits of the procedure  Risks including but not limited to bleeding, perforation, infection, and missed lesion  PREPARATION:   Telemetry, pulse oximetry, blood pressure were monitored throughout the procedure  Patient was identified by myself both verbally and by visual inspection of ID band  DESCRIPTION:   Patient was placed in the left lateral decubitus position and was sedated with the above medication  The gastroscope was introduced in to the oropharynx and the esophagus was intubated under direct visualization  Scope was passed down the esophagus up to 2nd part of the duodenum  A careful inspection was made as the gastroscope was withdrawn, including a retroflexed view of the stomach; findings and interventions are described below  FINDINGS:    #1  Esophagus- LA class D, ulcerative esophagitis at the distal esophagus  Clean based ulcers with evidence of recent bleed  #2  Stomach- Evidence of erosive gastritis  Cold forcep biopsies taken at the antrum and body of the stomach to rule out H pylori  #3  Duodenum- Duodenitis of the bulb   Normal post bulbar duodenum  Cold forcep biopsies taken of the duodenum  IMPRESSIONS:      LA class D, ulcerative esophagitis  Erosive gastritis, biopsies taken  Duodenitis of the duodenal bulb  Normal postbulbar duodenum, biopsies taken    RECOMMENDATIONS:     Start PPI  Anti-reflux measures  Follow up biopsies  Proceed to colonoscopy as previously scheduled  COMPLICATIONS:  None; patient tolerated the procedure well            DISPOSITION: PACU           CONDITION: Stable        SIGNATURE: Nellie Marinelli MD  DATE: March 30, 2018  TIME: 6:05 PM

## 2018-03-30 NOTE — DISCHARGE SUMMARY
Discharge- Forbes Scheuermann [place] 1933, 80 y o  female MRN: [id number]    Unit/Bed#: -01 Encounter: [id number]    Primary Care Provider: Chelsea Bush PA-C   Date and time admitted to hospital: 3/27/2018  7:30 PM        * Anemia due to blood loss, acute   Assessment & Plan    · CT chest abdomen pelvis without acute pathology  · Patient sent in by Gastroenterology up at [facility] for evaluation  EGD and colonoscopy negative  EGD showed gastritis  · GI stated okay for discharge  Adenocarcinoma of lung [facility])   Assessment & Plan    · History of prior resection  · No recurrence on recent CT scan        Hypertension   Assessment & Plan    · Continue with anti hypertensive agents  Weight loss   Assessment & Plan    · Encourage proper nutrition         Severe protein-calorie malnutrition Tanya Covington: less than 60% of standard weight) [facility]   Assessment & Plan    Malnutrition Findings:           BMI Findings: Body mass index is 19 73 kg/m²  Encourage proper diet          Resolved Problems  Date Reviewed: 3/30/2018    None          Consultations During Hospital Stay:  · Gastroenterology    Procedures Performed:     · CT scan of the abdomen and pelvis did not show any acute pathology  · EGD and colonoscopy just showed gastritis and no acute pathology    Significant Findings / Test Results:     · None    Incidental Findings:   · None     Test Results Pending at Discharge (will require follow up): · None     Outpatient Tests Requested:  · None    Complications:  None    Reason for Admission:  Anemia    Hospital Course:     Clarissa Jacob is a 80 y o  female patient who originally presented to the hospital on 3/27/2018 due to anemia  Patient states she had change in her bowel movements as well  Given her low hemoglobin of approximately 7 on admission, the patient was transferred from [facility] to [facility] for GI evaluation    They evaluated the patient and decide EGD and colonoscopy would be performed in addition to a CT scan of the abdomen pelvis  These were done on the results are as above  Patient is tolerating her diet well and will be going home today  Please see above list of diagnoses and related plan for additional information  Condition at Discharge: stable     Discharge Day Visit / Exam:     * Please refer to separate progress note for these details *    Discussion with Family:  Updated daughter Cas Verma    Discharge instructions/Information to patient and family:   See after visit summary for information provided to patient and family  Provisions for Follow-Up Care:  See after visit summary for information related to follow-up care and any pertinent home health orders  Disposition:     Home    For Discharges to [facility] SNF:   · Not Applicable to this Patient - Not Applicable to this Patient    Planned Readmission: not anticipated     Discharge Statement:  I spent 38 minutes discharging the patient  This time was spent on the day of discharge  I had direct contact with the patient on the day of discharge  Greater than 50% of the total time was spent examining patient, answering all patient questions, arranging and discussing plan of care with patient as well as directly providing post-discharge instructions  Additional time then spent on discharge activities  Discharge Medications:  See after visit summary for reconciled discharge medications provided to patient and family        ** Please Note: This note has been constructed using a voice recognition system **

## 2018-03-30 NOTE — ASSESSMENT & PLAN NOTE
Malnutrition Findings:           BMI Findings: Body mass index is 19 73 kg/m²     Encourage proper diet

## 2018-03-30 NOTE — PROGRESS NOTES
Progress Note - Dorcas Gutierrez 1933, 80 y o  female MRN: [id number]    Unit/Bed#: -01 Encounter: [id number]    Primary Care Provider: Nacho Almodovar PA-C   Date and time admitted to hospital: 3/27/2018  7:30 PM      * Anemia due to blood loss, acute   Assessment & Plan    · CT chest abdomen pelvis without acute pathology  · Patient sent in by Gastroenterology up at [facility] for evaluation  Will get EGD and colono today  · H/H stable        Adenocarcinoma of lung ([facility] [address] )   Assessment & Plan    · History of prior resection  · No recurrence on recent CT scan        Hypertension   Assessment & Plan    · Continue with anti hypertensive agents  Weight loss   Assessment & Plan    · Encourage proper nutrition           VTE Pharmacologic Prophylaxis:   Pharmacologic: Pharmacologic VTE Prophylaxis contraindicated due to possible GI bleed  Mechanical VTE Prophylaxis in Place: Yes    Patient Centered Rounds: I have performed bedside rounds with nursing staff today  Discussions with Specialists or Other Care Team Provider: Primary RN    Education and Discussions with Family / Patient: Patient     Time Spent for Care: 20 minutes  More than 50% of total time spent on counseling and coordination of care as described above  Current Length of Stay: 3 day(s)    Current Patient Status: Inpatient   Certification Statement: The patient will continue to require additional inpatient hospital stay due to possible GI bleeding  Discharge Plan / Estimated Discharge Date: later today if tests are normal      Code Status: Level 1 - Full Code      Subjective:   Feels fine  Just wants to go home  No bleeding per rectum  No abdominal pains  Objective:     Vitals:   Temp (24hrs), Av 6 °F (36 4 °C), Min:97 6 °F (36 4 °C), Max:97 6 °F (36 4 °C)    HR:  [83-85] 83  Resp:  [18] 18  BP: (109-117)/(63-67) 117/63  SpO2:  [93 %-100 %] 93 %  Body mass index is 19 73 kg/m²       Input and Output Summary (last 24 hours): Intake/Output Summary (Last 24 hours) at 03/30/18 1156  Last data filed at 03/29/18 2354   Gross per 24 hour   Intake             1440 ml   Output             1200 ml   Net              240 ml       Physical Exam:     Physical Exam   Constitutional: She is oriented to person, place, and time  She appears well-nourished  HENT:   Head: Normocephalic  Eyes: Pupils are equal, round, and reactive to light  Neck: Normal range of motion  Neck supple  Cardiovascular: Normal rate, regular rhythm and normal heart sounds  No murmur heard  Pulmonary/Chest: Effort normal and breath sounds normal    Abdominal: Soft  Bowel sounds are normal    Musculoskeletal: Normal range of motion  Neurological: She is alert and oriented to person, place, and time  Skin: Skin is warm and dry  Psychiatric: She has a normal mood and affect  Her behavior is normal  Thought content normal    Nursing note and vitals reviewed  Additional Data:     Labs:      Results from last 7 days  Lab Units 03/28/18  0456   WBC Thousand/uL 6 31   HEMOGLOBIN g/dL 8 5*   HEMATOCRIT % 26 0*   PLATELETS Thousands/uL 389   NEUTROS PCT % 69   LYMPHS PCT % 19   MONOS PCT % 9   EOS PCT % 3       Results from last 7 days  Lab Units 03/28/18  0456   SODIUM mmol/L 139   POTASSIUM mmol/L 3 6   CHLORIDE mmol/L 106   CO2 mmol/L 26   BUN mg/dL 17   CREATININE mg/dL 1 04   CALCIUM mg/dL 8 7   TOTAL PROTEIN g/dL 7 3   BILIRUBIN TOTAL mg/dL 0 31   ALK PHOS U/L 63   ALT U/L 14   AST U/L 22   GLUCOSE RANDOM mg/dL 89       Results from last 7 days  Lab Units 03/27/18  1604   INR  0 96       * I Have Reviewed All Lab Data Listed Above        Recent Cultures (last 7 days):           Last 24 Hours Medication List:     Current Facility-Administered Medications:  barium sulfate 450 mL Oral Once in imaging Evette Louie MD   lisinopril 10 mg Oral Daily Zain Friedman DO   ondansetron 4 mg Intravenous Q6H PRN Zain Friedman DO   pantoprazole 40 mg Intravenous Q12H [address] Hetul DO Elder        Today, Patient Was Seen By: JIMENEZ Martin    ** Please Note: Dragon 360 Dictation voice to text software may have been used in the creation of this document   **

## 2018-03-30 NOTE — ASSESSMENT & PLAN NOTE
· CT chest abdomen pelvis without acute pathology  · Patient sent in by Gastroenterology up at [facility] for evaluation  EGD and colonoscopy negative  EGD showed gastritis  · GI stated okay for discharge

## 2018-03-30 NOTE — ANESTHESIA POSTPROCEDURE EVALUATION
Post-Op Assessment Note      CV Status:  Stable    Mental Status:  Somnolent    Hydration Status:  Euvolemic    PONV Controlled:  Controlled    Airway Patency:  Patent    Post Op Vitals Reviewed: Yes          Staff: KYLIE           /64 (03/30/18 1307)    Temp     Pulse 77 (03/30/18 1307)   Resp 16 (03/30/18 1307)    SpO2 100 % (03/30/18 1307)

## 2018-03-30 NOTE — OP NOTE
OPERATIVE REPORT  PATIENT NAME: Wilbert Grimaldo    :  1933  MRN: [id number]  Pt Location: [facility] ROOM 03    SURGERY DATE: 3/30/2018    Surgeon(s) and Role:     * Keyonna Varner MD - Primary    Preop Diagnosis:  Anemia [D64 9]    Post-Op Diagnosis Codes:     * Anemia [D64 9]    Procedure(s) (LRB):  EGD AND COLONOSCOPY (N/A)    Colonoscopy Procedure Note    Procedure: Colonoscopy    Sedation: Monitored anesthesia care, check anesthesia records      ASA Class: 3    INDICATIONS: Weight loss, anemia    POST-OP DIAGNOSIS: See the impression below    Procedure Details     Prior colonoscopy: More than 10 years ago  Informed consent was obtained for the procedure, including sedation  Risks of perforation, hemorrhage, adverse drug reaction and aspiration were discussed  The patient was placed in the left lateral decubitus position  Based on the pre-procedure assessment, including review of the patient's medical history, medications, allergies, and review of systems, she had been deemed to be an appropriate candidate for conscious sedation; she was therefore sedated with the medications listed below  The patient was monitored continuously with telemetry, pulse oximetry, blood pressure monitoring, and direct observations  A rectal examination was performed  The pediatric colonoscope was inserted into the rectum and advanced under direct vision to the cecum, which was identified by the ileocecal valve and appendiceal orifice  The quality of the colonic preparation was good  A careful inspection was made as the colonoscope was withdrawn, including a retroflexed view of the rectum; findings and interventions are described below  Findings:  Normal terminal ileum  Normal colonic mucosa seen in the entire visualized colon  Prep may have limited evaluation of small AVMs  No evidence of bleeding seen in the colon or examined terminal ileum             Complications:  None; patient tolerated the procedure well     Impression:    Normal terminal ileum  Normal colonoscopy  Recommendations:    Repeat colonoscopy not recommended  If repeat colonoscopy is not being recommended, this is because of age (age = 77 or greater)  Monitor hemoglobin  Consider nutritional evaluation for calorie count  May benefit from addition of Ensure shakes      SIGNATURE: Enoch Prajapati MD  DATE: March 30, 2018  TIME: 6:12 PM

## 2018-04-02 NOTE — PROGRESS NOTES
Please call patient with normal biopsy results - no celiac, mild gastritis (inflammation of the stomach)  Nothing to explain anemia or low blood counts

## 2018-04-05 ENCOUNTER — OFFICE VISIT (OUTPATIENT)
Dept: FAMILY MEDICINE CLINIC | Facility: CLINIC | Age: 83
End: 2018-04-05
Payer: MEDICARE

## 2018-04-05 VITALS
HEIGHT: 56 IN | WEIGHT: 87 LBS | BODY MASS INDEX: 19.57 KG/M2 | HEART RATE: 84 BPM | SYSTOLIC BLOOD PRESSURE: 132 MMHG | TEMPERATURE: 97.3 F | OXYGEN SATURATION: 98 % | RESPIRATION RATE: 16 BRPM | DIASTOLIC BLOOD PRESSURE: 78 MMHG

## 2018-04-05 DIAGNOSIS — D50.9 IRON DEFICIENCY ANEMIA, UNSPECIFIED IRON DEFICIENCY ANEMIA TYPE: Primary | ICD-10-CM

## 2018-04-05 DIAGNOSIS — Z09 FOLLOW UP: ICD-10-CM

## 2018-04-05 PROCEDURE — 99213 OFFICE O/P EST LOW 20 MIN: CPT | Performed by: FAMILY MEDICINE

## 2018-04-05 NOTE — PROGRESS NOTES
History and Physical  Luciano Gutierrez 80 y o  female MRN: [id number]      Assessment:   Gastritis  Anemia    Plan:  Continue as per Gi  Labs as ordered  RTC 2 months  Chief Complaint   Patient presents with    Follow-up     hospital f/u- pt states had lack of appetite, but is ok now        HPI:  Corby Gamboa is a 80 y o  female who presents with above  She is doing much better now  Appitite is much improved  She had recent EGD and colonoscopy  Found to have mild gastritis  Historical Information   Past Medical History:   Diagnosis Date    Cancer ([facility] [address] )     lung    Hyperlipidemia     Hypertension     Hyponatremia     Small bowel obstruction     last assessed 16     Past Surgical History:   Procedure Laterality Date    CATARACT EXTRACTION       SECTION      CHEST WALL BIOPSY N/A 2016    Procedure: Uterine Biopsy;  Surgeon: Steven Cristina MD;  [facility]: [facility];  Service:     EGD AND COLONOSCOPY N/A 3/30/2018    Procedure: EGD AND COLONOSCOPY;  Surgeon: Teri Baxter MD;  Location: [facility]; Service: Gastroenterology    LAPAROTOMY N/A 2016    Procedure: LAPAROTOMY EXPLORATORY, LYSIS OF Uterine ADHESIONS;  Surgeon: Pascual Sousa DO;  Location: [facility];  Service:     LUNG LOBECTOMY       Social History   History   Alcohol Use No     History   Drug Use No     History   Smoking Status    Never Smoker   Smokeless Tobacco    Never Used     Family History   Problem Relation Age of Onset    No Known Problems Family        Meds/Allergies   No Known Allergies    Meds:    Current Outpatient Prescriptions:     hydrochlorothiazide (MICROZIDE) 12 5 mg capsule, Take 12 5 mg by mouth daily, Disp: , Rfl:     lisinopril (ZESTRIL) 10 mg tablet, Take 10 mg by mouth daily, Disp: , Rfl:       REVIEW OF SYSTEMS  Review of Systems   Constitutional: Negative  HENT: Negative  Eyes: Negative  Respiratory: Negative  Cardiovascular: Negative      Gastrointestinal:        As per HPI    Endocrine: Negative  Genitourinary: Negative  Musculoskeletal: Negative  Skin: Negative  Allergic/Immunologic: Negative  Neurological: Negative  Hematological: Negative  Psychiatric/Behavioral: Negative  Current Vitals:   Blood Pressure: 132/78 (04/05/18 0904)  Pulse: 84 (04/05/18 0904)  Temperature: (!) 97 3 °F (36 3 °C) (04/05/18 0904)  Respirations: 16 (04/05/18 0904)  Height: 4' 8" (142 2 cm) (04/05/18 0904)  Weight - Scale: 39 5 kg (87 lb) (04/05/18 0904)  SpO2: 98 % (04/05/18 0904)      PHYSICAL EXAMS:  Physical Exam   Constitutional:   79 yo female who is frail but in no acute distress  HENT:   Head: Normocephalic and atraumatic  Right Ear: External ear normal    Left Ear: External ear normal    Eyes: EOM are normal  Pupils are equal, round, and reactive to light  Neck: Normal range of motion  Neck supple  No thyromegaly present  Cardiovascular: Normal rate and regular rhythm  Pulmonary/Chest: Effort normal and breath sounds normal  She has no wheezes  She has no rales  Abdominal: Soft  Bowel sounds are normal  There is no tenderness  Neurological: She is alert  No cranial nerve deficit  Skin: Skin is warm and dry  Psychiatric: She has a normal mood and affect  Lab Results:          Eduard Webb PA-C  4/5/2018, 9:20 AM

## 2018-04-30 ENCOUNTER — APPOINTMENT (OUTPATIENT)
Dept: LAB | Facility: MEDICAL CENTER | Age: 83
End: 2018-04-30
Payer: MEDICARE

## 2018-04-30 ENCOUNTER — TRANSCRIBE ORDERS (OUTPATIENT)
Dept: RADIOLOGY | Facility: MEDICAL CENTER | Age: 83
End: 2018-04-30

## 2018-04-30 DIAGNOSIS — D50.9 IRON DEFICIENCY ANEMIA, UNSPECIFIED IRON DEFICIENCY ANEMIA TYPE: ICD-10-CM

## 2018-04-30 LAB
BASOPHILS # BLD AUTO: 0.01 THOUSANDS/ΜL (ref 0–0.1)
BASOPHILS NFR BLD AUTO: 0 % (ref 0–1)
EOSINOPHIL # BLD AUTO: 0.2 THOUSAND/ΜL (ref 0–0.61)
EOSINOPHIL NFR BLD AUTO: 3 % (ref 0–6)
ERYTHROCYTE [DISTWIDTH] IN BLOOD BY AUTOMATED COUNT: 15 % (ref 11.6–15.1)
HCT VFR BLD AUTO: 21.7 % (ref 34.8–46.1)
HGB BLD-MCNC: 7 G/DL (ref 11.5–15.4)
IRON SERPL-MCNC: 14 UG/DL (ref 50–170)
LYMPHOCYTES # BLD AUTO: 1.15 THOUSANDS/ΜL (ref 0.6–4.47)
LYMPHOCYTES NFR BLD AUTO: 18 % (ref 14–44)
MCH RBC QN AUTO: 28.5 PG (ref 26.8–34.3)
MCHC RBC AUTO-ENTMCNC: 32.3 G/DL (ref 31.4–37.4)
MCV RBC AUTO: 88 FL (ref 82–98)
MONOCYTES # BLD AUTO: 0.61 THOUSAND/ΜL (ref 0.17–1.22)
MONOCYTES NFR BLD AUTO: 10 % (ref 4–12)
NEUTROPHILS # BLD AUTO: 4.37 THOUSANDS/ΜL (ref 1.85–7.62)
NEUTS SEG NFR BLD AUTO: 69 % (ref 43–75)
NRBC BLD AUTO-RTO: 0 /100 WBCS
PLATELET # BLD AUTO: 336 THOUSANDS/UL (ref 149–390)
PMV BLD AUTO: 9.8 FL (ref 8.9–12.7)
RBC # BLD AUTO: 2.46 MILLION/UL (ref 3.81–5.12)
WBC # BLD AUTO: 6.35 THOUSAND/UL (ref 4.31–10.16)

## 2018-04-30 PROCEDURE — 36415 COLL VENOUS BLD VENIPUNCTURE: CPT

## 2018-04-30 PROCEDURE — 85025 COMPLETE CBC W/AUTO DIFF WBC: CPT

## 2018-04-30 PROCEDURE — 83540 ASSAY OF IRON: CPT

## 2018-05-02 DIAGNOSIS — E61.1 LOW IRON: Primary | ICD-10-CM

## 2018-05-02 RX ORDER — FERROUS SULFATE TAB EC 324 MG (65 MG FE EQUIVALENT) 324 (65 FE) MG
324 TABLET DELAYED RESPONSE ORAL
Qty: 60 TABLET | Refills: 1 | Status: SHIPPED | OUTPATIENT
Start: 2018-05-02 | End: 2018-09-17 | Stop reason: SDUPTHER

## 2018-05-17 ENCOUNTER — OFFICE VISIT (OUTPATIENT)
Dept: FAMILY MEDICINE CLINIC | Facility: CLINIC | Age: 83
End: 2018-05-17
Payer: MEDICARE

## 2018-05-17 VITALS
OXYGEN SATURATION: 98 % | WEIGHT: 88 LBS | TEMPERATURE: 96.8 F | SYSTOLIC BLOOD PRESSURE: 142 MMHG | RESPIRATION RATE: 17 BRPM | HEIGHT: 56 IN | HEART RATE: 89 BPM | BODY MASS INDEX: 19.8 KG/M2 | DIASTOLIC BLOOD PRESSURE: 76 MMHG

## 2018-05-17 DIAGNOSIS — D50.8 OTHER IRON DEFICIENCY ANEMIA: Primary | ICD-10-CM

## 2018-05-17 DIAGNOSIS — I10 ESSENTIAL HYPERTENSION: ICD-10-CM

## 2018-05-17 PROCEDURE — 99213 OFFICE O/P EST LOW 20 MIN: CPT | Performed by: FAMILY MEDICINE

## 2018-05-17 RX ORDER — HYDROCHLOROTHIAZIDE 12.5 MG/1
12.5 CAPSULE, GELATIN COATED ORAL DAILY
Qty: 30 CAPSULE | Refills: 3 | Status: SHIPPED | OUTPATIENT
Start: 2018-05-17 | End: 2018-07-19 | Stop reason: SDUPTHER

## 2018-05-17 RX ORDER — LISINOPRIL 10 MG/1
10 TABLET ORAL DAILY
Qty: 30 TABLET | Refills: 3 | Status: SHIPPED | OUTPATIENT
Start: 2018-05-17 | End: 2018-07-19 | Stop reason: SDUPTHER

## 2018-05-17 NOTE — PROGRESS NOTES
History and Physical  Pete Morrison 80 y o  female MRN: [id number]      Assessment:   HTN  Anemia    Plan:  Hematology appt scheduled for 18  Continue current meds  Labs as ordered 1 week before Hematology appt   RTC 4 months or sooner if needed  Chief Complaint   Patient presents with    Follow-up     Daughter is here to talk about mother care  HPI:  Pete Morrison is a 80 y o  female who presents with daughter to discuss her mother  Yamini Reynoso does not speak [place] well  Daughter reprts she is not taking her meds  She is not eating or drinking  Daughter is moving her into her house  She is becoming very unbalanced  Historical Information   Past Medical History:   Diagnosis Date    Cancer ([facility] [address] )     lung    Hyperlipidemia     Hypertension     Hyponatremia     Small bowel obstruction ([facility] [address] )     last assessed 16     Past Surgical History:   Procedure Laterality Date    CATARACT EXTRACTION       SECTION      CHEST WALL BIOPSY N/A 2016    Procedure: Uterine Biopsy;  Surgeon: Abhi Garner MD;  [facility]: [facility];  Service:     EGD AND COLONOSCOPY N/A 3/30/2018    Procedure: EGD AND COLONOSCOPY;  Surgeon: Tj Sullivan MD;  Location[facility];   Service: Gastroenterology    LAPAROTOMY N/A 2016    Procedure: LAPAROTOMY EXPLORATORY, LYSIS OF Uterine ADHESIONS;  Surgeon: Rc Felder DO;  [facility];  Service:     LUNG LOBECTOMY       Social History   History   Alcohol Use No     History   Drug Use No     History   Smoking Status    Never Smoker   Smokeless Tobacco    Never Used     Family History   Problem Relation Age of Onset    No Known Problems Family        Meds/Allergies   No Known Allergies    Meds:    Current Outpatient Prescriptions:     ferrous sulfate 324 (65 Fe) mg, Take 1 tablet (324 mg total) by mouth 2 (two) times a day before meals, Disp: 60 tablet, Rfl: 1    hydrochlorothiazide (MICROZIDE) 12 5 mg capsule, Take 12 5 mg by mouth daily, Disp: , Rfl:     lisinopril (ZESTRIL) 10 mg tablet, Take 10 mg by mouth daily, Disp: , Rfl:       REVIEW OF SYSTEMS  Review of Systems   Constitutional: Positive for fatigue  HENT: Negative  Eyes: Negative  Respiratory: Positive for shortness of breath  Cardiovascular: Negative  Gastrointestinal: Negative  Endocrine: Negative  Genitourinary: Negative  Musculoskeletal: Negative  Skin: Negative  Allergic/Immunologic: Negative  Neurological:        Loss of balance   Hematological: Negative  Current Vitals:   Blood Pressure: 142/76 (05/17/18 1112)  Pulse: 89 (05/17/18 1112)  Temperature: (!) 96 8 °F (36 °C) (05/17/18 1112)  Respirations: 17 (05/17/18 1112)  Height: 4' 8" (142 2 cm) (05/17/18 1112)  Weight - Scale: 39 9 kg (88 lb) (05/17/18 1112)  SpO2: 98 % (05/17/18 1112)  Body mass index is 19 73 kg/m²  PHYSICAL EXAMS:  Physical Exam   Constitutional:   79 yo frial female who appears in NAD  HENT:   Head: Normocephalic and atraumatic  Right Ear: External ear normal    Left Ear: External ear normal    Eyes: Pupils are equal, round, and reactive to light  Neck: Normal range of motion  Neck supple  No thyromegaly present  Cardiovascular: Normal rate and regular rhythm  Pulmonary/Chest: Effort normal and breath sounds normal  She has no wheezes  She has no rales  Abdominal: Soft  Bowel sounds are normal  There is no tenderness  Musculoskeletal: She exhibits no edema  Skin: Skin is warm and dry  Psychiatric: She has a normal mood and affect  Lab Results:          Soumya Garcia PA-C  5/17/2018, 11:18 AM

## 2018-06-12 ENCOUNTER — OFFICE VISIT (OUTPATIENT)
Dept: HEMATOLOGY ONCOLOGY | Facility: HOSPITAL | Age: 83
End: 2018-06-12
Payer: MEDICARE

## 2018-06-12 ENCOUNTER — APPOINTMENT (OUTPATIENT)
Dept: LAB | Facility: HOSPITAL | Age: 83
End: 2018-06-12
Attending: INTERNAL MEDICINE
Payer: MEDICARE

## 2018-06-12 VITALS
HEIGHT: 55 IN | OXYGEN SATURATION: 96 % | TEMPERATURE: 97.7 F | DIASTOLIC BLOOD PRESSURE: 72 MMHG | RESPIRATION RATE: 16 BRPM | HEART RATE: 89 BPM | WEIGHT: 85 LBS | SYSTOLIC BLOOD PRESSURE: 136 MMHG | BODY MASS INDEX: 19.67 KG/M2

## 2018-06-12 DIAGNOSIS — D50.8 IRON DEFICIENCY ANEMIA SECONDARY TO INADEQUATE DIETARY IRON INTAKE: ICD-10-CM

## 2018-06-12 DIAGNOSIS — D64.9 ANEMIA, UNSPECIFIED TYPE: Primary | ICD-10-CM

## 2018-06-12 DIAGNOSIS — D50.8 IRON DEFICIENCY ANEMIA SECONDARY TO INADEQUATE DIETARY IRON INTAKE: Primary | ICD-10-CM

## 2018-06-12 LAB
ALBUMIN SERPL BCP-MCNC: 3.9 G/DL (ref 3.5–5)
ALP SERPL-CCNC: 60 U/L (ref 46–116)
ALT SERPL W P-5'-P-CCNC: 32 U/L (ref 12–78)
ANION GAP SERPL CALCULATED.3IONS-SCNC: 10 MMOL/L (ref 4–13)
AST SERPL W P-5'-P-CCNC: 35 U/L (ref 5–45)
BASOPHILS # BLD AUTO: 0.02 THOUSANDS/ΜL (ref 0–0.1)
BASOPHILS NFR BLD AUTO: 0 % (ref 0–1)
BILIRUB SERPL-MCNC: 0.2 MG/DL (ref 0.2–1)
BUN SERPL-MCNC: 30 MG/DL (ref 5–25)
CALCIUM SERPL-MCNC: 9.9 MG/DL (ref 8.3–10.1)
CHLORIDE SERPL-SCNC: 99 MMOL/L (ref 100–108)
CO2 SERPL-SCNC: 26 MMOL/L (ref 21–32)
CREAT SERPL-MCNC: 1.21 MG/DL (ref 0.6–1.3)
EOSINOPHIL # BLD AUTO: 0.16 THOUSAND/ΜL (ref 0–0.61)
EOSINOPHIL NFR BLD AUTO: 2 % (ref 0–6)
ERYTHROCYTE [DISTWIDTH] IN BLOOD BY AUTOMATED COUNT: 16.3 % (ref 11.6–15.1)
ERYTHROCYTE [SEDIMENTATION RATE] IN BLOOD: 36 MM/HOUR (ref 0–20)
FOLATE SERPL-MCNC: >20 NG/ML (ref 3.1–17.5)
GFR SERPL CREATININE-BSD FRML MDRD: 41 ML/MIN/1.73SQ M
GLUCOSE P FAST SERPL-MCNC: 86 MG/DL (ref 65–99)
HCT VFR BLD AUTO: 31.2 % (ref 34.8–46.1)
HGB BLD-MCNC: 10 G/DL (ref 11.5–15.4)
LYMPHOCYTES # BLD AUTO: 1.11 THOUSANDS/ΜL (ref 0.6–4.47)
LYMPHOCYTES NFR BLD AUTO: 15 % (ref 14–44)
MCH RBC QN AUTO: 27.8 PG (ref 26.8–34.3)
MCHC RBC AUTO-ENTMCNC: 32.1 G/DL (ref 31.4–37.4)
MCV RBC AUTO: 87 FL (ref 82–98)
MONOCYTES # BLD AUTO: 0.43 THOUSAND/ΜL (ref 0.17–1.22)
MONOCYTES NFR BLD AUTO: 6 % (ref 4–12)
NEUTROPHILS # BLD AUTO: 5.6 THOUSANDS/ΜL (ref 1.85–7.62)
NEUTS SEG NFR BLD AUTO: 76 % (ref 43–75)
PLATELET # BLD AUTO: 308 THOUSANDS/UL (ref 149–390)
PMV BLD AUTO: 9.7 FL (ref 8.9–12.7)
POTASSIUM SERPL-SCNC: 4.4 MMOL/L (ref 3.5–5.3)
PROT SERPL-MCNC: 8 G/DL (ref 6.4–8.2)
RBC # BLD AUTO: 3.6 MILLION/UL (ref 3.81–5.12)
SODIUM SERPL-SCNC: 135 MMOL/L (ref 136–145)
TSH SERPL DL<=0.05 MIU/L-ACNC: 0.87 UIU/ML (ref 0.36–3.74)
VIT B12 SERPL-MCNC: 807 PG/ML (ref 100–900)
WBC # BLD AUTO: 7.32 THOUSAND/UL (ref 4.31–10.16)

## 2018-06-12 PROCEDURE — 85025 COMPLETE CBC W/AUTO DIFF WBC: CPT

## 2018-06-12 PROCEDURE — 83883 ASSAY NEPHELOMETRY NOT SPEC: CPT

## 2018-06-12 PROCEDURE — 85652 RBC SED RATE AUTOMATED: CPT

## 2018-06-12 PROCEDURE — 84443 ASSAY THYROID STIM HORMONE: CPT

## 2018-06-12 PROCEDURE — 82746 ASSAY OF FOLIC ACID SERUM: CPT

## 2018-06-12 PROCEDURE — 82607 VITAMIN B-12: CPT

## 2018-06-12 PROCEDURE — 80053 COMPREHEN METABOLIC PANEL: CPT

## 2018-06-12 PROCEDURE — 36415 COLL VENOUS BLD VENIPUNCTURE: CPT

## 2018-06-12 PROCEDURE — 99205 OFFICE O/P NEW HI 60 MIN: CPT | Performed by: INTERNAL MEDICINE

## 2018-06-12 PROCEDURE — 84165 PROTEIN E-PHORESIS SERUM: CPT

## 2018-06-12 PROCEDURE — 84165 PROTEIN E-PHORESIS SERUM: CPT | Performed by: PATHOLOGY

## 2018-06-12 NOTE — PROGRESS NOTES
Clarissa Jacob  1933  [address] [facility] [place]  Kashmir Fulton [address] [place] [id number]    Chief Complaint   Patient presents with    Consult            No history exists  History of Present Illness:  March 2018 patient was admitted for anemia  GI evaluation showed inflammation of the upper digestive tract  Biopsies proved negative, however  Colonoscopy negative  CT chest abdomen pelvis showed no bleeding source  She was discharged     -Earle Arm PA-C:          Review of Systems:  Review of Systems   Constitutional: Negative for appetite change, diaphoresis, fatigue and fever  HENT: Negative for sinus pain  Eyes: Negative for discharge  Respiratory: Negative for cough and shortness of breath  Cardiovascular: Negative for chest pain  Gastrointestinal: Negative for abdominal pain, constipation and diarrhea  Endocrine: Negative for cold intolerance  Genitourinary: Negative for difficulty urinating and hematuria  Musculoskeletal: Negative for joint swelling  Skin: Negative for rash  Allergic/Immunologic: Negative for environmental allergies  Neurological: Negative for dizziness and headaches  Hematological: Negative for adenopathy  Psychiatric/Behavioral: Negative for agitation         Patient Active Problem List   Diagnosis    Small bowel obstruction (HCC)    Adenocarcinoma of lung (HCC)    Fatigue    Hypercholesteremia    Hypertension    Anemia due to blood loss, acute    Hyponatremia    Thyroid nodule    Weight loss    Recurrent falls    Anemia    Severe protein-calorie malnutrition Tanya Valliant: less than 60% of standard weight) ([facility] [address] )     Past Medical History:   Diagnosis Date    Cancer ([address] )     lung    Hyperlipidemia     Hypertension     Hyponatremia     Small bowel obstruction ([address] )     last assessed 8/19/16     Past Surgical History:   Procedure Laterality Date    CATARACT EXTRACTION   SECTION      CHEST WALL BIOPSY N/A 2016    Procedure: Uterine Biopsy;  Surgeon: Alem Pruett MD;  [facility];  Service:     EGD AND COLONOSCOPY N/A 3/30/2018    Procedure: EGD AND COLONOSCOPY;  Surgeon: Leona Rodrigues MD;  Location: [facility]; Service: Gastroenterology    LAPAROTOMY N/A 2016    Procedure: LAPAROTOMY EXPLORATORY, LYSIS OF Uterine ADHESIONS;  Surgeon: Ke Ruiz DO;  Location: [facility];  Service:     LUNG LOBECTOMY       Family History   Problem Relation Age of Onset    No Known Problems Family      Social History     Social History    Marital status:      Spouse name: N/A    Number of children: N/A    Years of education: N/A     Occupational History    retired      Social History Main Topics    Smoking status: Never Smoker    Smokeless tobacco: Never Used    Alcohol use No    Drug use: No    Sexual activity: Not on file     Other Topics Concern    Not on file     Social History Narrative    No narrative on file       Current Outpatient Prescriptions:     ferrous sulfate 324 (65 Fe) mg, Take 1 tablet (324 mg total) by mouth 2 (two) times a day before meals, Disp: 60 tablet, Rfl: 1    hydrochlorothiazide (MICROZIDE) 12 5 mg capsule, Take 1 capsule (12 5 mg total) by mouth daily, Disp: 30 capsule, Rfl: 3    lisinopril (ZESTRIL) 10 mg tablet, Take 1 tablet (10 mg total) by mouth daily, Disp: 30 tablet, Rfl: 3  No Known Allergies  Vitals:    18 1111   BP: 136/72   Pulse: 89   Resp: 16   Temp: 97 7 °F (36 5 °C)   SpO2: 96%         Physical Exam   Constitutional: She is oriented to person, place, and time  She appears well-developed  HENT:   Head: Normocephalic  Eyes: Pupils are equal, round, and reactive to light  Neck: Neck supple  Cardiovascular: Normal rate  No murmur heard  Pulmonary/Chest: No respiratory distress  She has no wheezes  She has no rales  Abdominal: Soft  She exhibits no distension   There is no tenderness  There is no rebound  Musculoskeletal: She exhibits no edema  Lymphadenopathy:     She has no cervical adenopathy  Neurological: She is alert and oriented to person, place, and time  She displays normal reflexes  Skin: Skin is warm  No rash noted  Psychiatric: She has a normal mood and affect  Thought content normal            Performance Status: ECOG/Zubrod/WHO: 0 - Asymptomatic    Labs:  CBC, Coags, BMP, Mg, Phos     Imaging  No results found  I reviewed the above laboratory and imaging data  Discussion/Summary:  In summary, this is an 63-year-old female history of anemia that appears to evolved over the past year or so  Additionally, she has had substantial weight loss of greater than 20 lb over the past 6 months  She does not eat meat but does eat fish with some regularity  She had an EGD and colonoscopy in March 2018  Esophagitis, gastritis, duodenitis were all noted  Biopsies showed chronic inactive gastritis  No malignancy noted  Colonoscopy was essentially normal although the prep was somewhat limited  CT chest abdomen pelvis at that time showed trace right pleural effusion  No evidence of abdominal or other mass to explain her anemia  Serum iron has become somewhat depressed over the past few months  Interestingly, MCV remains normal despite decreasing hemoglobin  White count and differential as well as platelets have been normal   I made arrangements for blood work as well as Hemoccult  Lastly, it should be noted that her creatinine clearance is moderately depressed  creatinine 1 04  Given her age and small size, my calculation of her creatinine clearance is 24  Erythropoietin deficiency is a potential contributor to her anemia as well  I asked her to discontinue vitamin supplements an oral iron replacement at this time  Continue antihypertensives  I reviewed the above with the patient and her friend, who accompanied her    Her friend will be seeing her on a daily basis and help  her through the above process

## 2018-06-12 NOTE — LETTER
June 12, 2018     Elier Bowens PA-C  [address]    Patient: Carole Jones   YOB: 1933   Date of Visit: 6/12/2018       Dear Dr Scottie Mandujano: Thank you for referring Ferny Soto to me for evaluation  Below are my notes for this consultation  If you have questions, please do not hesitate to call me  I look forward to following your patient along with you  Sincerely,        Isabel Weinberg DO        CC: MD Isabel Alvarado DO  6/12/2018 11:53 AM  Sign at close encounter  Carole Jones  1933  [place] [address]  [address] [place] [id number]    Chief Complaint   Patient presents with    Consult            No history exists  History of Present Illness:  March 2018 patient was admitted for anemia  GI evaluation showed inflammation of the upper digestive tract  Biopsies proved negative, however  Colonoscopy negative  CT chest abdomen pelvis showed no bleeding source  She was discharged     -Marline Mars PA-C:          Review of Systems:  Review of Systems   Constitutional: Negative for appetite change, diaphoresis, fatigue and fever  HENT: Negative for sinus pain  Eyes: Negative for discharge  Respiratory: Negative for cough and shortness of breath  Cardiovascular: Negative for chest pain  Gastrointestinal: Negative for abdominal pain, constipation and diarrhea  Endocrine: Negative for cold intolerance  Genitourinary: Negative for difficulty urinating and hematuria  Musculoskeletal: Negative for joint swelling  Skin: Negative for rash  Allergic/Immunologic: Negative for environmental allergies  Neurological: Negative for dizziness and headaches  Hematological: Negative for adenopathy  Psychiatric/Behavioral: Negative for agitation         Patient Active Problem List   Diagnosis    Small bowel obstruction (HCC)    [facility] of lung ([facility] )    Fatigue    Hypercholesteremia    Hypertension    Anemia due to blood loss, acute    Hyponatremia    Thyroid nodule    Weight loss    Recurrent falls    Anemia    Severe protein-calorie malnutrition Frankie Zacarias: less than 60% of standard weight) ([facility] )     Past Medical History:   Diagnosis Date    Cancer ([facility] )     lung    Hyperlipidemia     Hypertension     Hyponatremia     Small bowel obstruction ([facility] )     last assessed 16     Past Surgical History:   Procedure Laterality Date    CATARACT EXTRACTION       SECTION      CHEST WALL BIOPSY N/A 2016    Procedure: Uterine Biopsy;  Surgeon: Yasmeen Zaragoza MD;  [facility];  Service:     EGD AND COLONOSCOPY N/A 3/30/2018    Procedure: EGD AND COLONOSCOPY;  Surgeon: Bernardino Martinez MD;  [facility]; Service: Gastroenterology    LAPAROTOMY N/A 2016    Procedure: LAPAROTOMY EXPLORATORY, LYSIS OF Uterine ADHESIONS;  Surgeon: Alto Fabry, DO;  [facility];  Service:     LUNG LOBECTOMY       Family History   Problem Relation Age of Onset    No Known Problems Family      Social History     Social History    Marital status:       Spouse name: N/A    Number of children: N/A    Years of education: N/A     Occupational History    retired      Social History Main Topics    Smoking status: Never Smoker    Smokeless tobacco: Never Used    Alcohol use No    Drug use: No    Sexual activity: Not on file     Other Topics Concern    Not on file     Social History Narrative    No narrative on file       Current Outpatient Prescriptions:     ferrous sulfate 324 (65 Fe) mg, Take 1 tablet (324 mg total) by mouth 2 (two) times a day before meals, Disp: 60 tablet, Rfl: 1    hydrochlorothiazide (MICROZIDE) 12 5 mg capsule, Take 1 capsule (12 5 mg total) by mouth daily, Disp: 30 capsule, Rfl: 3    lisinopril (ZESTRIL) 10 mg tablet, Take 1 tablet (10 mg total) by mouth daily, Disp: 30 tablet, Rfl: 3  No Known Allergies  Vitals:    06/12/18 1111   BP: 136/72   Pulse: 89   Resp: 16   Temp: 97 7 °F (36 5 °C)   SpO2: 96%         Physical Exam   Constitutional: She is oriented to person, place, and time  She appears well-developed  HENT:   Head: Normocephalic  Eyes: Pupils are equal, round, and reactive to light  Neck: Neck supple  Cardiovascular: Normal rate  No murmur heard  Pulmonary/Chest: No respiratory distress  She has no wheezes  She has no rales  Abdominal: Soft  She exhibits no distension  There is no tenderness  There is no rebound  Musculoskeletal: She exhibits no edema  Lymphadenopathy:     She has no cervical adenopathy  Neurological: She is alert and oriented to person, place, and time  She displays normal reflexes  Skin: Skin is warm  No rash noted  Psychiatric: She has a normal mood and affect  Thought content normal            Performance Status: ECOG/Zubrod/WHO: 0 - Asymptomatic    Labs:  CBC, Coags, BMP, Mg, Phos     Imaging  No results found  I reviewed the above laboratory and imaging data  Discussion/Summary:  In summary, this is an 68-year-old female history of anemia that appears to evolved over the past year or so  Additionally, she has had substantial weight loss of greater than 20 lb over the past 6 months  She does not eat meat but does eat fish with some regularity  She had an EGD and colonoscopy in March 2018  Esophagitis, gastritis, duodenitis were all noted  Biopsies showed chronic inactive gastritis  No malignancy noted  Colonoscopy was essentially normal although the prep was somewhat limited  CT chest abdomen pelvis at that time showed trace right pleural effusion  No evidence of abdominal or other mass to explain her anemia  Serum iron has become somewhat depressed over the past few months  Interestingly, MCV remains normal despite decreasing hemoglobin    White count and differential as well as platelets have been normal   I made arrangements for blood work as well as Hemoccult  Lastly, it should be noted that her creatinine clearance is moderately depressed  creatinine 1 04  Given her age and small size, my calculation of her creatinine clearance is 24  Erythropoietin deficiency is a potential contributor to her anemia as well  I asked her to discontinue vitamin supplements an oral iron replacement at this time  Continue antihypertensives  I reviewed the above with the patient and her friend, who accompanied her  Her friend will be seeing her on a daily basis and help  her through the above process

## 2018-06-13 LAB
KAPPA LC FREE SER-MCNC: 51 MG/L (ref 3.3–19.4)
KAPPA LC FREE/LAMBDA FREE SER: 1.5 {RATIO} (ref 0.26–1.65)
LAMBDA LC FREE SERPL-MCNC: 34.1 MG/L (ref 5.7–26.3)

## 2018-06-15 LAB
ALBUMIN SERPL ELPH-MCNC: 4.26 G/DL (ref 3.5–5)
ALBUMIN SERPL ELPH-MCNC: 51.9 % (ref 52–65)
ALPHA1 GLOB SERPL ELPH-MCNC: 0.46 G/DL (ref 0.1–0.4)
ALPHA1 GLOB SERPL ELPH-MCNC: 5.6 % (ref 2.5–5)
ALPHA2 GLOB SERPL ELPH-MCNC: 1.08 G/DL (ref 0.4–1.2)
ALPHA2 GLOB SERPL ELPH-MCNC: 13.2 % (ref 7–13)
BETA GLOB ABNORMAL SERPL ELPH-MCNC: 0.53 G/DL (ref 0.4–0.8)
BETA1 GLOB SERPL ELPH-MCNC: 6.5 % (ref 5–13)
BETA2 GLOB SERPL ELPH-MCNC: 4.1 % (ref 2–8)
BETA2+GAMMA GLOB SERPL ELPH-MCNC: 0.34 G/DL (ref 0.2–0.5)
GAMMA GLOB ABNORMAL SERPL ELPH-MCNC: 1.53 G/DL (ref 0.5–1.6)
GAMMA GLOB SERPL ELPH-MCNC: 18.7 % (ref 12–22)
IGG/ALB SER: 1.08 {RATIO} (ref 1.1–1.8)
PROT PATTERN SERPL ELPH-IMP: ABNORMAL
PROT SERPL-MCNC: 8.2 G/DL (ref 6.4–8.2)

## 2018-06-23 ENCOUNTER — APPOINTMENT (OUTPATIENT)
Dept: LAB | Facility: HOSPITAL | Age: 83
End: 2018-06-23
Attending: INTERNAL MEDICINE
Payer: MEDICARE

## 2018-06-23 DIAGNOSIS — D64.9 ANEMIA, UNSPECIFIED TYPE: ICD-10-CM

## 2018-06-23 DIAGNOSIS — D50.8 IRON DEFICIENCY ANEMIA SECONDARY TO INADEQUATE DIETARY IRON INTAKE: ICD-10-CM

## 2018-06-23 LAB
HEMOCCULT SP1 STL QL: NEGATIVE
HEMOCCULT SP2 STL QL: NEGATIVE
HEMOCCULT SP3 STL QL: NEGATIVE

## 2018-06-23 PROCEDURE — 82270 OCCULT BLOOD FECES: CPT

## 2018-06-29 ENCOUNTER — OFFICE VISIT (OUTPATIENT)
Dept: HEMATOLOGY ONCOLOGY | Facility: HOSPITAL | Age: 83
End: 2018-06-29
Payer: MEDICARE

## 2018-06-29 ENCOUNTER — HOSPITAL ENCOUNTER (OUTPATIENT)
Dept: RADIOLOGY | Facility: HOSPITAL | Age: 83
Discharge: HOME | End: 2018-06-29
Payer: MEDICARE

## 2018-06-29 VITALS
HEIGHT: 55 IN | DIASTOLIC BLOOD PRESSURE: 88 MMHG | RESPIRATION RATE: 16 BRPM | OXYGEN SATURATION: 98 % | WEIGHT: 83.5 LBS | HEART RATE: 83 BPM | BODY MASS INDEX: 19.32 KG/M2 | SYSTOLIC BLOOD PRESSURE: 148 MMHG | TEMPERATURE: 98.1 F

## 2018-06-29 DIAGNOSIS — R63.4 WEIGHT LOSS: ICD-10-CM

## 2018-06-29 DIAGNOSIS — C34.90 ADENOCARCINOMA OF LUNG, UNSPECIFIED LATERALITY (HCC): ICD-10-CM

## 2018-06-29 DIAGNOSIS — D50.8 IRON DEFICIENCY ANEMIA SECONDARY TO INADEQUATE DIETARY IRON INTAKE: Primary | ICD-10-CM

## 2018-06-29 DIAGNOSIS — J90 PLEURAL EFFUSION: ICD-10-CM

## 2018-06-29 PROCEDURE — 99214 OFFICE O/P EST MOD 30 MIN: CPT | Performed by: PHYSICIAN ASSISTANT

## 2018-06-29 PROCEDURE — 71046 X-RAY EXAM CHEST 2 VIEWS: CPT

## 2018-06-29 NOTE — PROGRESS NOTES
Hematology/Oncology Outpatient Follow-up  Camila Coley 80 y o  female 1933 [id number]    Date:  6/29/2018      Assessment and Plan:  1  Iron deficiency anemia secondary to inadequate dietary iron intake  Etiology of patient's anemia is unclear  She had EGD and colonoscopy in March 2018 which was negative  She also had imaging of her chest abdomen pelvis which was also negative  Occult stool test negative  SPEP negative  Free light chain ratio 1 5   TSH normal, F83 and folic acid also normal  Sed rate 36  Hemoglobin has improved to 10 in June 2018 compared to 7 0 in April 2018  No intervention was implemented during this time except continuation of oral iron  Continue to monitor  Evaluation of epo due to renal dysfunction      - CBC and differential  - Comprehensive metabolic panel  - Iron Panel; Future  - Erythropoietin  - Ambulatory referral to Gastroenterology; Future    2  Weight loss  She has lost 2 lbs in 2 weeks  Her friend states she still is not eating well  Advise follow up with GI  - Ambulatory referral to Gastroenterology; Future    3  Adenocarcinoma of lung, unspecified laterality ([facility] ), 4  Pleural effusion  History of stage 1 lung cancer  She was noted to have slight pleural effusion on imaging in March  Assess CXR today for pleural effusion  CT chest/abd/pelvis in March was negative for disease recurrence  - XR chest pa & lateral; Future    Follow up 1 month  HPI:  80year old female with anemia  She [facility] to [facility] in March 2018 with decreased hemoglobin and change in bowel movement  Patient underwent EGD and colonoscopy  This showed ulcerative esophagitis, erosive gastritis  She was advised to start PPI and anti reflux measures  Colonoscopy showed normal terminal ileum, normal colonic mucosa  Op note states that prep may have limited evaluation of small AVMs  No evidence of bleeding seen in the colon or ileum  Iron was assessed in Feb 2018 -- 48   Repeat level in 2018 was 14  She was previously taking oral iron but advised to discontinue at office visit on 18  Occult stool test negative  SPEP negative  Free light chain ratio 1 5   TSH normal, A16 and folic acid also normal  Sed rate 36  Interval history: continues to lose weight  No appetite  Stools are dark  She brought sample to office today for me to see  Was dark but not black  Continues to take walks around [place] without difficulty  ROS: Review of Systems   Constitutional: Negative for appetite change, chills, fever and unexpected weight change  HENT: Negative for mouth sores and nosebleeds  Respiratory: Negative for cough and shortness of breath  Cardiovascular: Negative for chest pain, palpitations and leg swelling  Gastrointestinal: Negative for abdominal distention, abdominal pain, blood in stool, constipation, diarrhea, nausea, rectal pain and vomiting  Dark stools   No appetite    Genitourinary: Negative for difficulty urinating, dysuria and hematuria  Musculoskeletal: Negative for arthralgias, joint swelling and myalgias  Skin: Negative  Neurological: Negative for dizziness, weakness, light-headedness, numbness and headaches  Hematological: Negative  Psychiatric/Behavioral: Negative  Past Medical History:   Diagnosis Date    Cancer ([facility] )     lung    Hyperlipidemia     Hypertension     Hyponatremia     Small bowel obstruction ([facility] )     last assessed 16       Past Surgical History:   Procedure Laterality Date    CATARACT EXTRACTION       SECTION      CHEST WALL BIOPSY N/A 2016    Procedure: Uterine Biopsy;  Surgeon: Rachelle Ley MD;  [facility];  Service:     EGD AND COLONOSCOPY N/A 3/30/2018    Procedure: EGD AND COLONOSCOPY;  Surgeon: Nellie Marinelli MD;  [facility];   Service: Gastroenterology    LAPAROTOMY N/A 2016    Procedure: LAPAROTOMY EXPLORATORY, LYSIS OF Uterine ADHESIONS;  Surgeon: Kiran Lopez, DO;  [facility];  Service:    [address] [place] [address] LUNG LOBECTOMY         Social History     Social History    Marital status:      Spouse name: N/A    Number of children: N/A    Years of education: N/A     Occupational History    retired      Social History Main Topics    Smoking status: Never Smoker    Smokeless tobacco: Never Used    Alcohol use No    Drug use: No    Sexual activity: Not on file     Other Topics Concern    Not on file     Social History Narrative    No narrative on file       Family History   Problem Relation Age of Onset    No Known Problems Family        No Known Allergies      Current Outpatient Prescriptions:     ferrous sulfate 324 (65 Fe) mg, Take 1 tablet (324 mg total) by mouth 2 (two) times a day before meals, Disp: 60 tablet, Rfl: 1    hydrochlorothiazide (MICROZIDE) 12 5 mg capsule, Take 1 capsule (12 5 mg total) by mouth daily, Disp: 30 capsule, Rfl: 3    lisinopril (ZESTRIL) 10 mg tablet, Take 1 tablet (10 mg total) by mouth daily, Disp: 30 tablet, Rfl: 3      Physical Exam:  Resp 16   Ht 4' 5" (1 346 m)     Physical Exam   Constitutional: She is oriented to person, place, and time  No distress  Underweight    HENT:   Head: Normocephalic and atraumatic  Eyes: Conjunctivae are normal  No scleral icterus  Neck: Normal range of motion  Neck supple  Cardiovascular: Normal rate, regular rhythm and normal heart sounds  No murmur heard  Pulmonary/Chest: Effort normal and breath sounds normal  No respiratory distress  Abdominal: Soft  There is no tenderness  Musculoskeletal: Normal range of motion  She exhibits no edema or tenderness  Lymphadenopathy:     She has no cervical adenopathy  Neurological: She is alert and oriented to person, place, and time  No cranial nerve deficit  Skin: Skin is warm and dry  Psychiatric: She has a normal mood and affect  Vitals reviewed          Labs:  Lab Results   Component Value Date    WBC 7 32 06/12/2018    HGB 10 0 (L) 06/12/2018    HCT 31 2 (L) 06/12/2018    MCV 87 06/12/2018     06/12/2018     Lab Results   Component Value Date     (L) 06/12/2018    K 4 4 06/12/2018    CL 99 (L) 06/12/2018    CO2 26 06/12/2018    ANIONGAP 10 06/12/2018    BUN 30 (H) 06/12/2018    CREATININE 1 21 06/12/2018    GLUCOSE 89 03/28/2018    GLUF 86 06/12/2018    CALCIUM 9 9 06/12/2018    AST 35 06/12/2018    ALT 32 06/12/2018    ALKPHOS 60 06/12/2018    PROT 8 0 06/12/2018    PROT 8 2 06/12/2018    BILITOT 0 20 06/12/2018    EGFR 41 06/12/2018       Patient voiced understanding and agreement in the above discussion  Aware to contact our office with questions/symptoms in the interim

## 2018-07-19 DIAGNOSIS — I10 ESSENTIAL HYPERTENSION: ICD-10-CM

## 2018-07-19 RX ORDER — HYDROCHLOROTHIAZIDE 12.5 MG/1
12.5 CAPSULE, GELATIN COATED ORAL DAILY
Qty: 90 CAPSULE | Refills: 0 | Status: SHIPPED | OUTPATIENT
Start: 2018-07-19

## 2018-07-19 RX ORDER — LISINOPRIL 10 MG/1
10 TABLET ORAL DAILY
Qty: 90 TABLET | Refills: 0 | Status: SHIPPED | OUTPATIENT
Start: 2018-07-19

## 2018-07-23 ENCOUNTER — APPOINTMENT (OUTPATIENT)
Dept: LAB | Facility: HOSPITAL | Age: 83
End: 2018-07-23
Payer: MEDICARE

## 2018-07-23 DIAGNOSIS — D50.8 IRON DEFICIENCY ANEMIA SECONDARY TO INADEQUATE DIETARY IRON INTAKE: ICD-10-CM

## 2018-07-23 LAB
ALBUMIN SERPL BCP-MCNC: 3.8 G/DL (ref 3.5–5)
ALP SERPL-CCNC: 46 U/L (ref 46–116)
ALT SERPL W P-5'-P-CCNC: 29 U/L (ref 12–78)
ANION GAP SERPL CALCULATED.3IONS-SCNC: 11 MMOL/L (ref 4–13)
AST SERPL W P-5'-P-CCNC: 29 U/L (ref 5–45)
BASOPHILS # BLD AUTO: 0.02 THOUSANDS/ΜL (ref 0–0.1)
BASOPHILS NFR BLD AUTO: 0 % (ref 0–1)
BILIRUB SERPL-MCNC: 0.2 MG/DL (ref 0.2–1)
BUN SERPL-MCNC: 21 MG/DL (ref 5–25)
CALCIUM SERPL-MCNC: 9.1 MG/DL (ref 8.3–10.1)
CHLORIDE SERPL-SCNC: 100 MMOL/L (ref 100–108)
CO2 SERPL-SCNC: 26 MMOL/L (ref 21–32)
CREAT SERPL-MCNC: 0.96 MG/DL (ref 0.6–1.3)
EOSINOPHIL # BLD AUTO: 0.16 THOUSAND/ΜL (ref 0–0.61)
EOSINOPHIL NFR BLD AUTO: 3 % (ref 0–6)
ERYTHROCYTE [DISTWIDTH] IN BLOOD BY AUTOMATED COUNT: 15 % (ref 11.6–15.1)
FERRITIN SERPL-MCNC: 21 NG/ML (ref 8–388)
GFR SERPL CREATININE-BSD FRML MDRD: 54 ML/MIN/1.73SQ M
GLUCOSE P FAST SERPL-MCNC: 92 MG/DL (ref 65–99)
HCT VFR BLD AUTO: 33.1 % (ref 34.8–46.1)
HGB BLD-MCNC: 10.9 G/DL (ref 11.5–15.4)
IRON SATN MFR SERPL: 11 %
IRON SERPL-MCNC: 37 UG/DL (ref 50–170)
LYMPHOCYTES # BLD AUTO: 1.33 THOUSANDS/ΜL (ref 0.6–4.47)
LYMPHOCYTES NFR BLD AUTO: 21 % (ref 14–44)
MCH RBC QN AUTO: 28 PG (ref 26.8–34.3)
MCHC RBC AUTO-ENTMCNC: 32.9 G/DL (ref 31.4–37.4)
MCV RBC AUTO: 85 FL (ref 82–98)
MONOCYTES # BLD AUTO: 0.45 THOUSAND/ΜL (ref 0.17–1.22)
MONOCYTES NFR BLD AUTO: 7 % (ref 4–12)
NEUTROPHILS # BLD AUTO: 4.25 THOUSANDS/ΜL (ref 1.85–7.62)
NEUTS SEG NFR BLD AUTO: 69 % (ref 43–75)
PLATELET # BLD AUTO: 252 THOUSANDS/UL (ref 149–390)
PMV BLD AUTO: 9.3 FL (ref 8.9–12.7)
POTASSIUM SERPL-SCNC: 3.6 MMOL/L (ref 3.5–5.3)
PROT SERPL-MCNC: 7.3 G/DL (ref 6.4–8.2)
RBC # BLD AUTO: 3.89 MILLION/UL (ref 3.81–5.12)
SODIUM SERPL-SCNC: 137 MMOL/L (ref 136–145)
TIBC SERPL-MCNC: 340 UG/DL (ref 250–450)
WBC # BLD AUTO: 6.21 THOUSAND/UL (ref 4.31–10.16)

## 2018-07-23 PROCEDURE — 85025 COMPLETE CBC W/AUTO DIFF WBC: CPT | Performed by: PHYSICIAN ASSISTANT

## 2018-07-23 PROCEDURE — 83540 ASSAY OF IRON: CPT

## 2018-07-23 PROCEDURE — 82728 ASSAY OF FERRITIN: CPT

## 2018-07-23 PROCEDURE — 80053 COMPREHEN METABOLIC PANEL: CPT | Performed by: PHYSICIAN ASSISTANT

## 2018-07-23 PROCEDURE — 83550 IRON BINDING TEST: CPT

## 2018-07-23 PROCEDURE — 36415 COLL VENOUS BLD VENIPUNCTURE: CPT | Performed by: PHYSICIAN ASSISTANT

## 2018-07-23 PROCEDURE — 82668 ASSAY OF ERYTHROPOIETIN: CPT | Performed by: PHYSICIAN ASSISTANT

## 2018-07-24 LAB — EPO SERPL-ACNC: 5.5 MIU/ML (ref 2.6–18.5)

## 2018-07-26 ENCOUNTER — TELEPHONE (OUTPATIENT)
Dept: FAMILY MEDICINE CLINIC | Facility: CLINIC | Age: 83
End: 2018-07-26

## 2018-07-26 NOTE — TELEPHONE ENCOUNTER
CALLED [facility] TO ARRANGE A RIDE FOR A RIDE TO APPT ON 7/27/18  AT 1:00 PM WITH DR ATWOOD [facility]  STS CONFIRMATION #  [id number]

## 2018-07-27 ENCOUNTER — OFFICE VISIT (OUTPATIENT)
Dept: HEMATOLOGY ONCOLOGY | Facility: HOSPITAL | Age: 83
End: 2018-07-27
Payer: MEDICARE

## 2018-07-27 VITALS
OXYGEN SATURATION: 98 % | BODY MASS INDEX: 19.67 KG/M2 | SYSTOLIC BLOOD PRESSURE: 120 MMHG | HEIGHT: 55 IN | WEIGHT: 85 LBS | TEMPERATURE: 98.1 F | RESPIRATION RATE: 16 BRPM | HEART RATE: 78 BPM | DIASTOLIC BLOOD PRESSURE: 78 MMHG

## 2018-07-27 DIAGNOSIS — D50.0 IRON DEFICIENCY ANEMIA DUE TO CHRONIC BLOOD LOSS: Primary | ICD-10-CM

## 2018-07-27 PROCEDURE — 99214 OFFICE O/P EST MOD 30 MIN: CPT | Performed by: PHYSICIAN ASSISTANT

## 2018-07-30 ENCOUNTER — TELEPHONE (OUTPATIENT)
Dept: HEMATOLOGY ONCOLOGY | Facility: CLINIC | Age: 83
End: 2018-07-30

## 2018-07-31 ENCOUNTER — TELEPHONE (OUTPATIENT)
Dept: HEMATOLOGY ONCOLOGY | Facility: CLINIC | Age: 83
End: 2018-07-31

## 2018-07-31 NOTE — TELEPHONE ENCOUNTER
Pt's daughter Iesha Ricardo called back to speak with Candace Chow in regards to her mother's plan of care  Best # to reach her is [phone] and she states it's ok to leave a message with plan for mom

## 2018-08-01 NOTE — TELEPHONE ENCOUNTER
Josefa called back again, please have [employer] call her back, she will be available today

## 2018-08-01 NOTE — TELEPHONE ENCOUNTER
Spoke with daughter  She is aware of her mother's dementia  She went to visit her yesterday and is planning to move her into her home in [facility] [place] 2018  Updated her on mother's care with our office  She is welcome to contact us with questions in the future

## 2018-08-02 RX ORDER — SODIUM CHLORIDE 9 MG/ML
20 INJECTION, SOLUTION INTRAVENOUS CONTINUOUS
Status: DISPENSED | OUTPATIENT
Start: 2018-08-06 | End: 2018-08-06

## 2018-08-06 ENCOUNTER — HOSPITAL ENCOUNTER (OUTPATIENT)
Dept: INFUSION CENTER | Facility: HOSPITAL | Age: 83
Discharge: HOME | End: 2018-08-06
Payer: MEDICARE

## 2018-08-06 VITALS
HEART RATE: 72 BPM | SYSTOLIC BLOOD PRESSURE: 160 MMHG | TEMPERATURE: 97.3 F | DIASTOLIC BLOOD PRESSURE: 80 MMHG | RESPIRATION RATE: 16 BRPM

## 2018-08-06 PROCEDURE — 96365 THER/PROPH/DIAG IV INF INIT: CPT

## 2018-08-06 RX ADMIN — SODIUM CHLORIDE 20 ML/HR: 0.9 INJECTION, SOLUTION INTRAVENOUS at 14:00

## 2018-08-06 RX ADMIN — FERUMOXYTOL 510 MG: 510 INJECTION INTRAVENOUS at 14:22

## 2018-08-06 NOTE — PLAN OF CARE
Problem: Potential for Falls  Goal: Patient will remain free of falls  INTERVENTIONS:  - Assess patient frequently for physical needs  -  Identify cognitive and physical deficits and behaviors that affect risk of falls  -  George fall precautions as indicated by assessment   - Educate patient/family on patient safety including physical limitations  - Instruct patient to call for assistance with activity based on assessment  - Modify environment to reduce risk of injury  - Consider OT/PT consult to assist with strengthening/mobility   Outcome: Progressing      Problem: Knowledge Deficit  Goal: Patient/family/caregiver demonstrates understanding of disease process, treatment plan, medications, and discharge instructions  Complete learning assessment and assess knowledge base    Interventions:  - Provide teaching at level of understanding  - Provide teaching via preferred learning methods  Outcome: Progressing

## 2018-08-09 RX ORDER — SODIUM CHLORIDE 9 MG/ML
20 INJECTION, SOLUTION INTRAVENOUS CONTINUOUS
Status: DISPENSED | OUTPATIENT
Start: 2018-08-14 | End: 2018-08-14

## 2018-08-14 ENCOUNTER — HOSPITAL ENCOUNTER (OUTPATIENT)
Dept: INFUSION CENTER | Facility: HOSPITAL | Age: 83
Discharge: HOME | End: 2018-08-14
Payer: MEDICARE

## 2018-08-14 VITALS
HEART RATE: 72 BPM | DIASTOLIC BLOOD PRESSURE: 65 MMHG | SYSTOLIC BLOOD PRESSURE: 149 MMHG | RESPIRATION RATE: 18 BRPM | TEMPERATURE: 98.5 F

## 2018-08-14 PROCEDURE — 96365 THER/PROPH/DIAG IV INF INIT: CPT

## 2018-08-14 RX ADMIN — FERUMOXYTOL 510 MG: 510 INJECTION INTRAVENOUS at 14:32

## 2018-08-14 RX ADMIN — SODIUM CHLORIDE 20 ML/HR: 0.9 INJECTION, SOLUTION INTRAVENOUS at 14:32

## 2018-08-14 NOTE — PROGRESS NOTES
Patient tolerated feraheme well  Discharged in stable condition with her friend  TELMA provided  Patient: Marilee Bell    Procedure(s):  COMBINED CYSTOSCOPY, LEFT URETEROSCOPY, LASER HOLMIUM LITHOTRIPSY URETER(S), INSERT LEFT STENT , BASKETING STONE FRAGMENTS - Wound Class: II-Clean Contaminated    Diagnosis:unknown  Diagnosis Additional Information: No value filed.    Anesthesia Type:  General, LMA    Note:  Anesthesia Post Evaluation    Patient location during evaluation: PACU  Patient participation: Able to fully participate in evaluation  Level of consciousness: awake  Pain management: adequate  Airway patency: patent  Cardiovascular status: acceptable  Respiratory status: acceptable  Hydration status: acceptable  PONV: none             Last vitals:  Vitals:    10/01/17 1522 10/01/17 1713 10/01/17 1715   BP: 104/44 (!) 83/56 (!) 99/35   Pulse:      Resp:  18 22   Temp: 96.9  F (36.1  C) 97.2  F (36.2  C)    SpO2: 96% 98% (!) 87%         Electronically Signed By: Joshua Mendiola MD  October 1, 2017  5:26 PM

## 2018-08-21 ENCOUNTER — APPOINTMENT (OUTPATIENT)
Dept: LAB | Facility: HOSPITAL | Age: 83
End: 2018-08-21
Payer: MEDICARE

## 2018-08-21 ENCOUNTER — TELEPHONE (OUTPATIENT)
Dept: HEMATOLOGY ONCOLOGY | Facility: CLINIC | Age: 83
End: 2018-08-21

## 2018-08-21 ENCOUNTER — OFFICE VISIT (OUTPATIENT)
Dept: GASTROENTEROLOGY | Facility: HOSPITAL | Age: 83
End: 2018-08-21
Payer: MEDICARE

## 2018-08-21 VITALS
BODY MASS INDEX: 19.3 KG/M2 | DIASTOLIC BLOOD PRESSURE: 81 MMHG | TEMPERATURE: 97.5 F | WEIGHT: 83.4 LBS | SYSTOLIC BLOOD PRESSURE: 151 MMHG | HEIGHT: 55 IN | HEART RATE: 74 BPM

## 2018-08-21 DIAGNOSIS — D64.9 ANEMIA, UNSPECIFIED TYPE: ICD-10-CM

## 2018-08-21 DIAGNOSIS — K20.90 ESOPHAGITIS: Primary | ICD-10-CM

## 2018-08-21 LAB
ALBUMIN SERPL BCP-MCNC: 3.9 G/DL (ref 3.5–5)
ALP SERPL-CCNC: 52 U/L (ref 46–116)
ALT SERPL W P-5'-P-CCNC: 26 U/L (ref 12–78)
ANION GAP SERPL CALCULATED.3IONS-SCNC: 8 MMOL/L (ref 4–13)
AST SERPL W P-5'-P-CCNC: 29 U/L (ref 5–45)
BASOPHILS # BLD AUTO: 0.01 THOUSANDS/ΜL (ref 0–0.1)
BASOPHILS NFR BLD AUTO: 0 % (ref 0–1)
BILIRUB SERPL-MCNC: 0.3 MG/DL (ref 0.2–1)
BUN SERPL-MCNC: 26 MG/DL (ref 5–25)
CALCIUM SERPL-MCNC: 9.1 MG/DL (ref 8.3–10.1)
CHLORIDE SERPL-SCNC: 103 MMOL/L (ref 100–108)
CO2 SERPL-SCNC: 26 MMOL/L (ref 21–32)
CREAT SERPL-MCNC: 0.73 MG/DL (ref 0.6–1.3)
EOSINOPHIL # BLD AUTO: 0.07 THOUSAND/ΜL (ref 0–0.61)
EOSINOPHIL NFR BLD AUTO: 1 % (ref 0–6)
ERYTHROCYTE [DISTWIDTH] IN BLOOD BY AUTOMATED COUNT: 14.6 % (ref 11.6–15.1)
GFR SERPL CREATININE-BSD FRML MDRD: 75 ML/MIN/1.73SQ M
GLUCOSE SERPL-MCNC: 101 MG/DL (ref 65–140)
HCT VFR BLD AUTO: 32.2 % (ref 34.8–46.1)
HGB BLD-MCNC: 10.5 G/DL (ref 11.5–15.4)
IMM GRANULOCYTES # BLD AUTO: 0.02 THOUSAND/UL (ref 0–0.2)
IMM GRANULOCYTES NFR BLD AUTO: 0 % (ref 0–2)
LYMPHOCYTES # BLD AUTO: 1.22 THOUSANDS/ΜL (ref 0.6–4.47)
LYMPHOCYTES NFR BLD AUTO: 23 % (ref 14–44)
MCH RBC QN AUTO: 28.1 PG (ref 26.8–34.3)
MCHC RBC AUTO-ENTMCNC: 32.6 G/DL (ref 31.4–37.4)
MCV RBC AUTO: 86 FL (ref 82–98)
MONOCYTES # BLD AUTO: 0.45 THOUSAND/ΜL (ref 0.17–1.22)
MONOCYTES NFR BLD AUTO: 9 % (ref 4–12)
NEUTROPHILS # BLD AUTO: 3.47 THOUSANDS/ΜL (ref 1.85–7.62)
NEUTS SEG NFR BLD AUTO: 67 % (ref 43–75)
NRBC BLD AUTO-RTO: 0 /100 WBCS
PLATELET # BLD AUTO: 205 THOUSANDS/UL (ref 149–390)
PMV BLD AUTO: 8.6 FL (ref 8.9–12.7)
POTASSIUM SERPL-SCNC: 3.8 MMOL/L (ref 3.5–5.3)
PROT SERPL-MCNC: 7.2 G/DL (ref 6.4–8.2)
RBC # BLD AUTO: 3.74 MILLION/UL (ref 3.81–5.12)
SODIUM SERPL-SCNC: 137 MMOL/L (ref 136–145)
WBC # BLD AUTO: 5.24 THOUSAND/UL (ref 4.31–10.16)

## 2018-08-21 PROCEDURE — 85025 COMPLETE CBC W/AUTO DIFF WBC: CPT | Performed by: PHYSICIAN ASSISTANT

## 2018-08-21 PROCEDURE — 80053 COMPREHEN METABOLIC PANEL: CPT | Performed by: PHYSICIAN ASSISTANT

## 2018-08-21 PROCEDURE — 99213 OFFICE O/P EST LOW 20 MIN: CPT | Performed by: PHYSICIAN ASSISTANT

## 2018-08-21 PROCEDURE — 36415 COLL VENOUS BLD VENIPUNCTURE: CPT | Performed by: PHYSICIAN ASSISTANT

## 2018-08-21 RX ORDER — PANTOPRAZOLE SODIUM 40 MG/1
40 TABLET, DELAYED RELEASE ORAL DAILY
Qty: 30 TABLET | Refills: 3 | Status: SHIPPED | OUTPATIENT
Start: 2018-08-21

## 2018-08-21 NOTE — PROGRESS NOTES
[facility] - Outpatient Follow-up Note  Dorcas Gutierrez 80 y o  female MRN: [id number]  Encounter: [id number]          ASSESSMENT AND PLAN:      1  Esophagitis: EGD 3/30 revealed LA class D, ulcerative esophagitis at the distal esophagus with clean based ulcers without evidence of recent bleed, erosive gastritis duodenitis  She is accompanied by caretaker who states she is not taking any medications  Recommend starting PPI 40mg daily and repeat EGD to ensure healing  She would like me to speak with patient's daughter regarding this  I have called and left a message for her daughter, Marion Serrano, to discuss further management   - pantoprazole (PROTONIX) 40 mg tablet; Take 1 tablet (40 mg total) by mouth daily  Dispense: 30 tablet; Refill: 3  -recommend repeat EGD to ensure healing    2  Anemia: most recent Hgb from July, Hgb increased to 10 9  She underwent EGD and colonoscopy which were negative for evidence of GI bleeding  Will recheck CBC, if Hgb continues to be low will consider capsule endoscopy for further evaluation   - CBC and differential    F/u 3 months   ______________________________________________________________________    SUBJECTIVE:  Renny Suresh is an 79 yo female with pmh lung cancer, SBO, HTN and anemia who is here for follow up of EGD and anemia  She was anemic and experiencing weight loss, she was admitted to the hospital for complete work up in march including CT c/a/p and EGD/colonoscopy  CT at that time was unremarkable in regards to her weight loss  EGD revealed LA class D, ulcerative esophagitis at the distal esophagus with clean based ulcers without evidence of recent bleed, erosive gastritis duodenitis  Colonoscopy was normal    Today she denies complains including lightheadedness, dizziness, abdominal pain, nausea, vomiting, diarrhea, constipation, melena or hematochezia  The caretaker with her today would like me to speak with her daughter regarding a repeat EGD   I called and left a message  REVIEW OF SYSTEMS IS OTHERWISE NEGATIVE  Historical Information   Past Medical History:   Diagnosis Date    Anemia     Cancer ([facility]     lung    Hyperlipidemia     Hypertension     Hyponatremia     Small bowel obstruction ([facility] )     last assessed 16     Past Surgical History:   Procedure Laterality Date    CATARACT EXTRACTION       SECTION      CHEST WALL BIOPSY N/A 2016    Procedure: Uterine Biopsy;  Surgeon: Kailey Pichardo MD;  [facility];  Service:     EGD AND COLONOSCOPY N/A 3/30/2018    Procedure: EGD AND COLONOSCOPY;  Surgeon: Phillip Mccord MD;  [facility]; Service: Gastroenterology    LAPAROTOMY N/A 2016    Procedure: LAPAROTOMY EXPLORATORY, LYSIS OF Uterine ADHESIONS;  Surgeon: Bhavana Chou DO;  [facility];  Service:     LUNG LOBECTOMY       Social History   History   Alcohol Use No     History   Drug Use No     History   Smoking Status    Never Smoker   Smokeless Tobacco    Never Used     Family History   Problem Relation Age of Onset    No Known Problems Family        Meds/Allergies       Current Outpatient Prescriptions:     ferrous sulfate 324 (65 Fe) mg    hydrochlorothiazide (MICROZIDE) 12 5 mg capsule    lisinopril (ZESTRIL) 10 mg tablet    pantoprazole (PROTONIX) 40 mg tablet    No Known Allergies        Objective     Blood pressure 151/81, pulse 74, temperature 97 5 °F (36 4 °C), temperature source Tympanic, height 4' 5" (1 346 m), weight 37 8 kg (83 lb 6 4 oz)  Body mass index is 20 87 kg/m²  PHYSICAL EXAM:      Physical Exam   Constitutional: She is oriented to person, place, and time  She appears well-developed and well-nourished  No distress  HENT:   Head: Normocephalic and atraumatic  Nose: Nose normal    Eyes: Right eye exhibits no discharge  Left eye exhibits no discharge  No scleral icterus  Neck: Normal range of motion  Neck supple  No tracheal deviation present  Cardiovascular: Normal rate, regular rhythm and normal heart sounds  Exam reveals no gallop and no friction rub  No murmur heard  Pulmonary/Chest: Effort normal and breath sounds normal  No stridor  No respiratory distress  She has no wheezes  She has no rales  Abdominal: Soft  Bowel sounds are normal  She exhibits no distension  There is no tenderness  There is no rebound and no guarding  Musculoskeletal: Normal range of motion  She exhibits no edema, tenderness or deformity  Neurological: She is alert and oriented to person, place, and time  Skin: Skin is warm and dry  No rash noted  She is not diaphoretic  No erythema  Psychiatric: She has a normal mood and affect  Her behavior is normal                                                        Lab Results:   No visits with results within 1 Day(s) from this visit  Latest known visit with results is:   Appointment on 07/23/2018   Component Date Value    Iron Saturation 07/23/2018 11     TIBC 07/23/2018 340     Iron 07/23/2018 37*    Ferritin 07/23/2018 21          Radiology Results:   No results found

## 2018-08-21 NOTE — TELEPHONE ENCOUNTER
ISELA Tate Call from pt's friend Kaitlin Lehman who drives pt to appts  Kaitlin Lehman reports pt does not want to see Dr Rex Simmons today (GI)  Pt does not want to see any more doctors  Kaitlin Lehman ask that pt's daughter Filemon Carrillo be called  Spoke with Josefa and provide update  Filemon Hammerstaciaeddie will speak with pt and then update Kaitlin Lehman  Informed in pt's best interest to see Dr Rex Simmons and detetmine if anemia r/t GI bldg      Kaitlin Lehman # [phone]

## 2018-08-23 ENCOUNTER — TELEPHONE (OUTPATIENT)
Dept: GASTROENTEROLOGY | Facility: MEDICAL CENTER | Age: 83
End: 2018-08-23

## 2018-08-24 DIAGNOSIS — D64.9 ANEMIA, UNSPECIFIED TYPE: Primary | ICD-10-CM

## 2018-09-04 PROBLEM — I11.9 HYPERTENSIVE HEART DISEASE WITHOUT HEART FAILURE: Status: ACTIVE | Noted: 2018-09-04

## 2018-09-04 PROBLEM — E11.9 DIABETES (HCC): Status: ACTIVE | Noted: 2018-09-04

## 2018-09-04 PROBLEM — R94.31 ABNORMAL EKG: Status: ACTIVE | Noted: 2018-09-04

## 2018-09-04 RX ORDER — MULTIVIT-MIN/IRON FUM/FOLIC AC 7.5 MG-4
TABLET ORAL DAILY
COMMUNITY

## 2018-09-05 ENCOUNTER — OFFICE VISIT (OUTPATIENT)
Dept: CARDIOLOGY CLINIC | Facility: CLINIC | Age: 83
End: 2018-09-05
Payer: MEDICARE

## 2018-09-05 ENCOUNTER — APPOINTMENT (OUTPATIENT)
Dept: LAB | Facility: MEDICAL CENTER | Age: 83
End: 2018-09-05
Payer: MEDICARE

## 2018-09-05 VITALS
DIASTOLIC BLOOD PRESSURE: 60 MMHG | WEIGHT: 86 LBS | HEART RATE: 81 BPM | BODY MASS INDEX: 19.9 KG/M2 | SYSTOLIC BLOOD PRESSURE: 130 MMHG | HEIGHT: 55 IN

## 2018-09-05 DIAGNOSIS — R06.02 SHORTNESS OF BREATH: Primary | ICD-10-CM

## 2018-09-05 DIAGNOSIS — R01.1 MURMUR: ICD-10-CM

## 2018-09-05 DIAGNOSIS — I10 ESSENTIAL HYPERTENSION: ICD-10-CM

## 2018-09-05 DIAGNOSIS — R06.02 SHORTNESS OF BREATH: ICD-10-CM

## 2018-09-05 LAB — NT-PROBNP SERPL-MCNC: 442 PG/ML

## 2018-09-05 PROCEDURE — 93000 ELECTROCARDIOGRAM COMPLETE: CPT | Performed by: INTERNAL MEDICINE

## 2018-09-05 PROCEDURE — 99214 OFFICE O/P EST MOD 30 MIN: CPT | Performed by: INTERNAL MEDICINE

## 2018-09-05 PROCEDURE — 36415 COLL VENOUS BLD VENIPUNCTURE: CPT

## 2018-09-05 PROCEDURE — 83880 ASSAY OF NATRIURETIC PEPTIDE: CPT

## 2018-09-05 NOTE — PROGRESS NOTES
Subjective:        Patient ID: Arthur Wilson is a 80 y o  female  Chief Complaint:  Kieran Milner is here in light of some vague shortness of breath complaints  Recent records reviewed, she has some anemia, undergoing EGD soon, gastric ulcer diagnosed, and a recent CT of chest abdomen and pelvis rather unrevealing  She examines euvolemic  She has no chest pains on exertion or without, denies palpitations presyncope or syncope  Her daughter is with her and is planning to be her caregiver, taking care of her niece 8 shortly, states she has been getting more and more demented lately, found wandering streets at night even  She said they made you aware of this  Echocardiogram in June was normal for her age other than mild MR   EKG stable  The following portions of the patient's history were reviewed and updated as appropriate: allergies, current medications, past family history, past medical history, past social history, past surgical history and problem list   Review of Systems   Constitution: Negative for chills, diaphoresis, malaise/fatigue and weight gain  HENT: Negative for nosebleeds and stridor  Eyes: Negative for double vision, vision loss in left eye, vision loss in right eye and visual disturbance  Cardiovascular: Positive for dyspnea on exertion  Negative for chest pain, claudication, cyanosis, irregular heartbeat, leg swelling, near-syncope, orthopnea, palpitations, paroxysmal nocturnal dyspnea and syncope  Respiratory: Positive for shortness of breath  Negative for cough, snoring and wheezing  Endocrine: Negative for polydipsia, polyphagia and polyuria  Hematologic/Lymphatic: Negative for bleeding problem  Does not bruise/bleed easily  Skin: Negative for flushing and rash  Musculoskeletal: Negative for falls and myalgias  Gastrointestinal: Negative for abdominal pain, heartburn, hematemesis, hematochezia, melena and nausea  Genitourinary: Negative for hematuria     Neurological: Negative for brief paralysis, dizziness, focal weakness, headaches, light-headedness, loss of balance and vertigo  Psychiatric/Behavioral: Negative for altered mental status and substance abuse  Allergic/Immunologic: Negative for hives  Objective:      /60   Pulse 81   Ht 4' 6" (1 372 m)   Wt 39 kg (86 lb)   BMI 20 74 kg/m²   Physical Exam   Constitutional: She is oriented to person, place, and time  She appears well-developed and well-nourished  HENT:   Head: Normocephalic and atraumatic  Eyes: EOM are normal  Pupils are equal, round, and reactive to light  Neck: Normal range of motion  Neck supple  No JVD present  No thyromegaly present  Cardiovascular: Normal rate, regular rhythm, normal heart sounds and intact distal pulses  Exam reveals no gallop and no friction rub  No murmur heard  Pulmonary/Chest: Effort normal and breath sounds normal  No stridor  No respiratory distress  She has no wheezes  She has no rales  Abdominal: Soft  Bowel sounds are normal  She exhibits no mass  There is no tenderness  Musculoskeletal: Normal range of motion  She exhibits no edema  Lymphadenopathy:     She has no cervical adenopathy  Neurological: She is alert and oriented to person, place, and time  Skin: Skin is warm and dry  No rash noted  No pallor  Psychiatric: She has a normal mood and affect  Her behavior is normal  Judgment and thought content normal    Nursing note and vitals reviewed        Lab Review:   Office Visit on 08/21/2018   Component Date Value    WBC 08/21/2018 5 24     RBC 08/21/2018 3 74*    Hemoglobin 08/21/2018 10 5*    Hematocrit 08/21/2018 32 2*    MCV 08/21/2018 86     MCH 08/21/2018 28 1     MCHC 08/21/2018 32 6     RDW 08/21/2018 14 6     MPV 08/21/2018 8 6*    Platelets 29/74/2379 205     nRBC 08/21/2018 0     Neutrophils Relative 08/21/2018 67     Immat GRANS % 08/21/2018 0     Lymphocytes Relative 08/21/2018 23     Monocytes Relative 08/21/2018 9     Eosinophils Relative 08/21/2018 1     Basophils Relative 08/21/2018 0     Neutrophils Absolute 08/21/2018 3 47     Immature Grans Absolute 08/21/2018 0 02     Lymphocytes Absolute 08/21/2018 1 22     Monocytes Absolute 08/21/2018 0 45     Eosinophils Absolute 08/21/2018 0 07     Basophils Absolute 08/21/2018 0 01    Office Visit on 07/27/2018   Component Date Value    Sodium 08/21/2018 137     Potassium 08/21/2018 3 8     Chloride 08/21/2018 103     CO2 08/21/2018 26     ANION GAP 08/21/2018 8     BUN 08/21/2018 26*    Creatinine 08/21/2018 0 73     Glucose 08/21/2018 101     Calcium 08/21/2018 9 1     AST 08/21/2018 29     ALT 08/21/2018 26     Alkaline Phosphatase 08/21/2018 52     Total Protein 08/21/2018 7 2     Albumin 08/21/2018 3 9     Total Bilirubin 08/21/2018 0 30     eGFR 08/21/2018 75    Appointment on 07/23/2018   Component Date Value    Iron Saturation 07/23/2018 11     TIBC 07/23/2018 340     Iron 07/23/2018 37*    Ferritin 07/23/2018 21      No results found  Assessment:       1  Shortness of breath  POCT ECG    NT-BNP PRO    Echo complete with contrast if indicated   2  Murmur  Echo complete with contrast if indicated   3  Essential hypertension          Plan:       I suspect her shortness of breath is noncardiac, likely related to deconditioning which has developed in light of dementia, she is much less active per her daughter, who possibly anemia contributing  I will order a BNP to make sure there is no diastolic CHF  Recent echocardiogram stable so I do not think this need to be repeated  With a stable CT of chest, I do not think anything further need be investigated at this time  She is quite frail, I think now entering a declining functional status lifestyle, fortunately she has someone to help her  They will call if the dyspnea worsens, if her BNP is elevated I will urged her to take her diuretic daily    She is not really taking any medications  Blood pressure stable anyway  Return office 6 month scheduled

## 2018-09-05 NOTE — PATIENT INSTRUCTIONS
Dyspnea   WHAT YOU NEED TO KNOW:   What is dyspnea? Dyspnea is breathing difficulty or discomfort  You may have labored, painful, or shallow breathing  You may feel breathless or short of breath  Dyspnea can occur during rest or with activity  You may have dyspnea for a short time, or it might become chronic  Dyspnea is often a symptom of a disease or condition  What signs and symptoms can occur with dyspnea? · Chest tightness or pain    · A cough, or a coarse or high-pitched noise when you breathe    · Pale and sweaty, cool skin    · Confusion and tiredness    · Bluish-gray lips or nails  What increases my risk for dyspnea? · Swelling in the throat from an infection or allergic reaction    · Lung conditions such as asthma, COPD, cancer, infection, or a blood clot    · Heart conditions such as abnormal heartbeats, heart failure, or coronary artery disease    · Smoking, exposure to chemicals such as carbon monoxide, or too much aspirin    · A condition that affects your central nervous system, such as a spinal cord injury or nerve damage    · Enlarged abdomen because you are overweight, pregnant, or have ascites (fluid in the abdomen) from liver disease     · Anemia (low red blood cell count), anxiety, panic, or going to a high altitude  How is the cause of dyspnea diagnosed? Your healthcare provider may ask when your dyspnea began and what you were doing  Tell him or her how often you have dyspnea and what makes it worse or better  Tell your healthcare provider about medicines you take  Describe any other symptoms, such as pain or a fever  Your healthcare provider will listen to you breathe and watch for irregular breathing  You may also need the following:  · A pulse oximeter  is a device that measures the amount of oxygen in your blood  A cord with a clip or sticky strip is placed on your finger, ear, or toe  The other end of the cord is hooked to a machine       · Blood tests  may show your healthcare provider if you are at risk for blood clots or heart failure  Blood tests can also show if you have anemia or an infection  · X-ray  pictures may show signs of infection or fluid around your heart or lungs  · Exercise tests  help your healthcare provider learn if you have symptoms, along with dyspnea, that limit activity  Symptoms include leg pain, fatigue, and weakness  Exercise tests can also show if your dyspnea is caused by heart problems  · CT scan  pictures may show blood clots or an area of disease in your lungs  You may be given contract liquid to help your lungs show up better in the pictures  Tell the healthcare provider if you have ever had an allergic reaction to contrast liquid  · An echocardiogram  is a type of ultrasound  Sound waves are used to show the structure and function of your heart  · An EKG  is a test that measures the electrical activity of your heart  How is dyspnea treated? You may need treatment if your symptoms prevent you from doing your daily activities  The condition causing your dyspnea may need to be treated  You may also need the following to improve your symptoms:  · Oxygen therapy  may be used to help you breathe easier  You may need oxygen if your blood oxygen level is lower than it should be  · Medicines  may be used to treat the cause of your dyspnea  Medicines may reduce swelling in your airway or decrease extra fluid from around your heart or lungs  Other medicines may be used to decrease anxiety and help you feel calm and relaxed  · Pulmonary rehabilitation  is used to reduce your symptoms while keeping you active  You may learn breathing techniques, muscle strengthening, and how to pace yourself when you are active  How can I manage long-term dyspnea? · Create an action plan  You and your healthcare provider can work together to create a plan for how to handle episodes of dyspnea   The plan can include daily activities, treatment changes, and what to do if you have severe breathing problems  · Lean forward on your elbows when you sit  This helps your lungs expand and may make it easier to breathe  · Use pursed-lip breathing any time you feel short of breath  Breathe in through your nose and then slowly breathe out through your mouth with your lips slightly puckered  It should take you twice as long to breathe out as it did to breathe in  · Do not smoke  Nicotine and other chemicals in cigarettes and cigars can cause lung damage and make it harder to breathe  Ask your healthcare provider for information if you currently smoke and need help to quit  E-cigarettes or smokeless tobacco still contain nicotine  Talk to your healthcare provider before you use these products  · Reach or maintain a healthy weight  Your healthcare provider can help you create a safe weight loss plan if you are overweight  · Exercise as directed  Exercise can help your lungs work more easily  Exercise can also help you lose weight if needed  Try to get at least 30 minutes of exercise most days of the week  Your healthcare provider can help you create an exercise plan that is safe for you  When should I seek immediate care? · Your signs and symptoms are the same or worse within 24 hours of treatment  · You have shaking chills or a fever over 102°F      · You have new pain, pressure, or tightness in your chest      · You have a new or worse cough or wheezing, or you cough up blood  · You feel like you cannot get enough air  · The skin over your ribs or on your neck sinks in when you breathe  · You have a severe headache with vomiting and abdominal pain  · You feel confused or dizzy  When should I contact my healthcare provider? · You have questions or concerns about your condition or care  CARE AGREEMENT:   You have the right to help plan your care  Learn about your health condition and how it may be treated   Discuss treatment options with your caregivers to decide what care you want to receive  You always have the right to refuse treatment  The above information is an  only  It is not intended as medical advice for individual conditions or treatments  Talk to your doctor, nurse or pharmacist before following any medical regimen to see if it is safe and effective for you  © 2017 [employer] Information is for End User's use only and may not be sold, redistributed or otherwise used for commercial purposes  All illustrations and images included in CareNotes® are the copyrighted property of [employer]  or [employer]ian Veneg[employer]

## 2018-09-14 ENCOUNTER — HOSPITAL ENCOUNTER (OUTPATIENT)
Dept: GASTROENTEROLOGY | Facility: HOSPITAL | Age: 83
Discharge: HOME | End: 2018-09-14
Payer: MEDICARE

## 2018-09-14 DIAGNOSIS — D64.9 ANEMIA, UNSPECIFIED TYPE: ICD-10-CM

## 2018-09-14 PROCEDURE — 91110 GI TRC IMG INTRAL ESOPH-ILE: CPT

## 2018-09-17 ENCOUNTER — OFFICE VISIT (OUTPATIENT)
Dept: FAMILY MEDICINE CLINIC | Facility: CLINIC | Age: 83
End: 2018-09-17
Payer: MEDICARE

## 2018-09-17 ENCOUNTER — TELEPHONE (OUTPATIENT)
Dept: GASTROENTEROLOGY | Facility: AMBULARY SURGERY CENTER | Age: 83
End: 2018-09-17

## 2018-09-17 VITALS — HEART RATE: 74 BPM | TEMPERATURE: 97.7 F | SYSTOLIC BLOOD PRESSURE: 130 MMHG | DIASTOLIC BLOOD PRESSURE: 80 MMHG

## 2018-09-17 DIAGNOSIS — I10 ESSENTIAL HYPERTENSION: Primary | ICD-10-CM

## 2018-09-17 DIAGNOSIS — K20.90 ESOPHAGITIS: ICD-10-CM

## 2018-09-17 DIAGNOSIS — E61.1 LOW IRON: ICD-10-CM

## 2018-09-17 PROBLEM — E11.9 DIABETES (HCC): Status: RESOLVED | Noted: 2018-09-04 | Resolved: 2018-09-17

## 2018-09-17 PROCEDURE — 99213 OFFICE O/P EST LOW 20 MIN: CPT | Performed by: FAMILY MEDICINE

## 2018-09-17 RX ORDER — FERROUS SULFATE TAB EC 324 MG (65 MG FE EQUIVALENT) 324 (65 FE) MG
324 TABLET DELAYED RESPONSE ORAL
Qty: 30 TABLET | Refills: 5 | Status: SHIPPED | OUTPATIENT
Start: 2018-09-17

## 2018-09-17 RX ORDER — PANTOPRAZOLE SODIUM 40 MG/1
40 TABLET, DELAYED RELEASE ORAL DAILY
Qty: 30 TABLET | Refills: 0 | Status: CANCELLED | OUTPATIENT
Start: 2018-09-17

## 2018-09-17 NOTE — TELEPHONE ENCOUNTER
Called and spoke to patient, she did not understand me, so I am going to call tomorrow and speak with her care giver

## 2018-09-17 NOTE — PROGRESS NOTES
History and Physical  Manus Paola Gutierrez 80 y o  female MRN: [id number]      Assessment:   HTN  Anemia    Plan:  She will continue current meds  Keep appt with Hem/ONC as scheduled  Follow up with Abrahan Farr for results of recent capsule endoscopy  RTC 6 months    Chief Complaint   Patient presents with    Follow-up        HPI:  Luciano Grey is a 80 y o  female who presents for routine follow up  She had recent capsule endoscopy but does not have results yet  She is doing well  Previous c/o SOB have resolved  Family friend accompanies her today and reports she is not eating as well as she should be  Historical Information   Past Medical History:   Diagnosis Date    Anemia     Cancer ([facility] [address] )     lung    HCVD (hypertensive cardiovascular disease)     History of stress test 2014    Using Rickey protocol    Hyperlipidemia     Hypertension     Hyponatremia     Hypotension, unspecified     Malignant neoplasm of unspecified part of unspecified bronchus or lung (HCC)     Nonrheumatic aortic (valve) stenosis     Small bowel obstruction ([facility] [address] )     last assessed 16     Past Surgical History:   Procedure Laterality Date    CATARACT EXTRACTION       SECTION      CHEST WALL BIOPSY N/A 2016    Procedure: Uterine Biopsy;  Surgeon: Alysha Hinds MD;  [facility]: [facility];  Service:     EGD AND COLONOSCOPY N/A 3/30/2018    Procedure: EGD AND COLONOSCOPY;  Surgeon: Wilton Mckeon MD;  Location: [facility];   Service: Gastroenterology    LAPAROTOMY N/A 2016    Procedure: LAPAROTOMY EXPLORATORY, LYSIS OF Uterine ADHESIONS;  Surgeon: Olivier Hwang DO;  Location: [facility];  Service:     LUNG LOBECTOMY       Social History   History   Alcohol Use No     History   Drug Use No     History   Smoking Status    Never Smoker   Smokeless Tobacco    Never Used     Family History   Problem Relation Age of Onset    No Known Problems Family        Meds/Allergies   No Known Allergies    Meds:    Current Outpatient Prescriptions:     ferrous sulfate 324 (65 Fe) mg, Take 1 tablet (324 mg total) by mouth 2 (two) times a day before meals, Disp: 60 tablet, Rfl: 1    hydrochlorothiazide (MICROZIDE) 12 5 mg capsule, Take 1 capsule (12 5 mg total) by mouth daily, Disp: 90 capsule, Rfl: 0    lisinopril (ZESTRIL) 10 mg tablet, Take 1 tablet (10 mg total) by mouth daily, Disp: 90 tablet, Rfl: 0    Multiple Vitamins-Minerals (MULTIVITAMIN WITH MINERALS) tablet, Take by mouth daily, Disp: , Rfl:     pantoprazole (PROTONIX) 40 mg tablet, Take 1 tablet (40 mg total) by mouth daily, Disp: 30 tablet, Rfl: 3      REVIEW OF SYSTEMS  Review of Systems   Unable to perform ROS: Other       Current Vitals:   Blood Pressure: 130/80 (09/17/18 1131)  Pulse: 74 (09/17/18 1131)  Temperature: 97 7 °F (36 5 °C) (09/17/18 1131)  Temp Source: Tympanic (09/17/18 1131)  There is no height or weight on file to calculate BMI  PHYSICAL EXAMS:  Physical Exam   Constitutional:   81 yo thin frail female who appears in NAD   HENT:   Head: Normocephalic and atraumatic  Eyes: EOM are normal    Neck: Normal range of motion  Neck supple  No thyromegaly present  Cardiovascular: Normal rate, regular rhythm and normal heart sounds  Pulmonary/Chest: Effort normal and breath sounds normal  She has no wheezes  She has no rales  Musculoskeletal: She exhibits no edema  Neurological: She is alert  Skin: Skin is warm and dry  Psychiatric: She has a normal mood and affect  Lab Results:          Archana Farias PA-C  9/17/2018, 11:34 AM

## 2018-09-21 DIAGNOSIS — D50.0 IRON DEFICIENCY ANEMIA SECONDARY TO BLOOD LOSS (CHRONIC): Primary | ICD-10-CM

## 2018-09-25 ENCOUNTER — TELEPHONE (OUTPATIENT)
Dept: HEMATOLOGY ONCOLOGY | Facility: HOSPITAL | Age: 83
End: 2018-09-25

## 2018-09-25 NOTE — TELEPHONE ENCOUNTER
Called and left message for Kaitlin Lehman  This is a friend of the patient that attends all of the appointment with Carl Hair and is signed on the communication form as the person to speak with  I called to let her know that Carl Hair is missing an iron panel for her appointment on 9/28 with Clary Rock and to make sure she has it done before the appointment  I left our number so she could call back with any questions

## 2018-09-28 ENCOUNTER — OFFICE VISIT (OUTPATIENT)
Dept: HEMATOLOGY ONCOLOGY | Facility: HOSPITAL | Age: 83
End: 2018-09-28
Payer: MEDICARE

## 2018-09-28 ENCOUNTER — APPOINTMENT (OUTPATIENT)
Dept: LAB | Facility: HOSPITAL | Age: 83
End: 2018-09-28
Payer: MEDICARE

## 2018-09-28 ENCOUNTER — TELEPHONE (OUTPATIENT)
Dept: GASTROENTEROLOGY | Facility: CLINIC | Age: 83
End: 2018-09-28

## 2018-09-28 VITALS
WEIGHT: 86.4 LBS | DIASTOLIC BLOOD PRESSURE: 70 MMHG | RESPIRATION RATE: 14 BRPM | TEMPERATURE: 97.8 F | OXYGEN SATURATION: 98 % | BODY MASS INDEX: 20.83 KG/M2 | SYSTOLIC BLOOD PRESSURE: 108 MMHG | HEART RATE: 88 BPM

## 2018-09-28 DIAGNOSIS — D50.8 IRON DEFICIENCY ANEMIA SECONDARY TO INADEQUATE DIETARY IRON INTAKE: Primary | ICD-10-CM

## 2018-09-28 DIAGNOSIS — R26.2 AMBULATORY DYSFUNCTION: ICD-10-CM

## 2018-09-28 DIAGNOSIS — D50.0 IRON DEFICIENCY ANEMIA DUE TO CHRONIC BLOOD LOSS: ICD-10-CM

## 2018-09-28 LAB
BASOPHILS # BLD AUTO: 0.03 THOUSANDS/ΜL (ref 0–0.1)
BASOPHILS NFR BLD AUTO: 0 % (ref 0–1)
EOSINOPHIL # BLD AUTO: 0.07 THOUSAND/ΜL (ref 0–0.61)
EOSINOPHIL NFR BLD AUTO: 1 % (ref 0–6)
ERYTHROCYTE [DISTWIDTH] IN BLOOD BY AUTOMATED COUNT: 13.8 % (ref 11.6–15.1)
FERRITIN SERPL-MCNC: 640 NG/ML (ref 8–388)
HCT VFR BLD AUTO: 35.6 % (ref 34.8–46.1)
HGB BLD-MCNC: 12.3 G/DL (ref 11.5–15.4)
IMM GRANULOCYTES # BLD AUTO: 0.03 THOUSAND/UL (ref 0–0.2)
IMM GRANULOCYTES NFR BLD AUTO: 0 % (ref 0–2)
IRON SATN MFR SERPL: 64 %
IRON SERPL-MCNC: 203 UG/DL (ref 50–170)
LYMPHOCYTES # BLD AUTO: 1.37 THOUSANDS/ΜL (ref 0.6–4.47)
LYMPHOCYTES NFR BLD AUTO: 20 % (ref 14–44)
MCH RBC QN AUTO: 30.1 PG (ref 26.8–34.3)
MCHC RBC AUTO-ENTMCNC: 34.6 G/DL (ref 31.4–37.4)
MCV RBC AUTO: 87 FL (ref 82–98)
MONOCYTES # BLD AUTO: 0.72 THOUSAND/ΜL (ref 0.17–1.22)
MONOCYTES NFR BLD AUTO: 11 % (ref 4–12)
NEUTROPHILS # BLD AUTO: 4.57 THOUSANDS/ΜL (ref 1.85–7.62)
NEUTS SEG NFR BLD AUTO: 68 % (ref 43–75)
NRBC BLD AUTO-RTO: 0 /100 WBCS
PLATELET # BLD AUTO: 249 THOUSANDS/UL (ref 149–390)
PMV BLD AUTO: 9.1 FL (ref 8.9–12.7)
RBC # BLD AUTO: 4.08 MILLION/UL (ref 3.81–5.12)
TIBC SERPL-MCNC: 319 UG/DL (ref 250–450)
WBC # BLD AUTO: 6.79 THOUSAND/UL (ref 4.31–10.16)

## 2018-09-28 PROCEDURE — 82728 ASSAY OF FERRITIN: CPT

## 2018-09-28 PROCEDURE — 85025 COMPLETE CBC W/AUTO DIFF WBC: CPT | Performed by: PHYSICIAN ASSISTANT

## 2018-09-28 PROCEDURE — 83550 IRON BINDING TEST: CPT

## 2018-09-28 PROCEDURE — 99213 OFFICE O/P EST LOW 20 MIN: CPT | Performed by: PHYSICIAN ASSISTANT

## 2018-09-28 PROCEDURE — 36415 COLL VENOUS BLD VENIPUNCTURE: CPT

## 2018-09-28 PROCEDURE — 83540 ASSAY OF IRON: CPT

## 2018-09-28 NOTE — TELEPHONE ENCOUNTER
----- Message from Anirudh Meadows PA-C sent at 8/24/2018  9:13 AM EDT -----  Please schedule for EGD   Thank you

## 2018-09-28 NOTE — PROGRESS NOTES
Hematology/Oncology Outpatient Follow-up  Carole Jones 80 y o  female 1933 [id number]    Date:  9/28/2018      Assessment and Plan:  1  Iron deficiency anemia secondary to inadequate dietary iron intake  Etiology of patient's anemia is likely multifactorial    EGD on 3/30/18 she has ulcerative esophagitis, duodenitis of the duodenal bulb  She was advised to take PPI  Colonoscopy on this day was negative  SPEP negative   Free light chain ratio 1 5   TSH normal, B56 and folic acid also normal  Sed rate 36  Hemoglobin has improved to 10 in June 2018 compared to 7 0 in April 2018   No intervention was implemented during this time except continuation of oral iron  Repeat labs in July 2018 showed hemoglobin 10 9  Iron panel showed iron saturation 11%, iron 37, ferritin 21  Patient then received Feraheme 510 mg IV weekly x 2  She also had capsule endoscopy which report is still pending at this time  CBC and iron panel are also pending as they were drawn just prior to the appointment time  Patient's caretaker, Bryn Spencershoshana, ([phone]) was present at the encounter today  She states that patient fell at grocery store yesterday and almost fell at the cafeteria today  Patient was oriented to place but not time  She was partially orietned to person  She did not know her birthday  I called patient's daughter and advised that it is not recommended that patient be living alone  She states she is planning to have her mother move in with her in October  Advised that this should happen soon, ie this weekend  She will go and see her mother this weekend  Will call her once labs are resulted  - Walker  - CBC and differential; Future  - Iron Panel; Future  - CBC and differential    2  Ambulatory dysfunction  - Walker      HPI:   80year old female with anemia  She presented to [facility] in March 2018 with decreased hemoglobin and change in bowel movement  Patient underwent EGD and colonoscopy   This showed ulcerative esophagitis, erosive gastritis  She was advised to start PPI and anti reflux measures       Colonoscopy showed normal terminal ileum, normal colonic mucosa  Op note states that prep may have limited evaluation of small AVMs  No evidence of bleeding seen in the colon or ileum       Iron was assessed in Feb 2018 -- 48  Repeat level in April 2018 was 14       She was previously taking oral iron but advised to discontinue at office visit on 6/12/18       Occult stool test negative   SPEP negative   Free light chain ratio 1 5   TSH normal, G52 and folic acid also normal  Sed rate 36      More Door [phone] 80year old female with anemia  She presented to [facility] in March 2018 with decreased hemoglobin and change in bowel movement  Patient underwent EGD and colonoscopy  This showed ulcerative esophagitis, erosive gastritis  She was advised to start PPI and anti reflux measures       Colonoscopy showed normal terminal ileum, normal colonic mucosa  Op note states that prep may have limited evaluation of small AVMs  No evidence of bleeding seen in the colon or ileum       Iron was assessed in Feb 2018 -- 48  Repeat level in April 2018 was 14       She was previously taking oral iron but advised to discontinue at office visit on 6/12/18       Occult stool test negative   SPEP negative   Free light chain ratio 1 5   TSH normal, U04 and folic acid also normal  Sed rate 36      Bethany [phone]     Interval history:    ROS: Review of Systems   Constitutional: Positive for appetite change (poor appetite)  Negative for chills, fever and unexpected weight change  Respiratory: Negative for cough and shortness of breath  Cardiovascular: Negative for chest pain, palpitations and leg swelling  Gastrointestinal: Negative for abdominal pain, blood in stool, constipation, diarrhea, nausea and vomiting  Genitourinary: Negative for difficulty urinating, dysuria and hematuria     Musculoskeletal: Negative for arthralgias, joint swelling and myalgias  Skin: Negative  Neurological: Positive for weakness  Negative for dizziness, light-headedness, numbness and headaches  Hematological: Negative  Past Medical History:   Diagnosis Date    Anemia     Cancer ([facility] [address] )     lung    HCVD (hypertensive cardiovascular disease)     History of stress test 2014    Using Rickey protocol    Hyperlipidemia     Hypertension     Hyponatremia     Hypotension, unspecified     Malignant neoplasm of unspecified part of unspecified bronchus or lung (HCC)     Nonrheumatic aortic (valve) stenosis     Small bowel obstruction ([facility] [address] )     last assessed 16       Past Surgical History:   Procedure Laterality Date    CATARACT EXTRACTION       SECTION      CHEST WALL BIOPSY N/A 2016    Procedure: Uterine Biopsy;  Surgeon: Una Chaidez MD;  Location: [facility];  Service:     EGD AND COLONOSCOPY N/A 3/30/2018    Procedure: EGD AND COLONOSCOPY;  Surgeon: Elieser Smith MD;  Location: [facility]; Service: Gastroenterology    LAPAROTOMY N/A 2016    Procedure: LAPAROTOMY EXPLORATORY, LYSIS OF Uterine ADHESIONS;  Surgeon: Jayden Tian DO;  Location: [facility];  Service:    Bijan Gonzalez LUNG LOBECTOMY         Social History     Social History    Marital status:       Spouse name: N/A    Number of children: N/A    Years of education: N/A     Occupational History    retired      Social History Main Topics    Smoking status: Never Smoker    Smokeless tobacco: Never Used    Alcohol use No    Drug use: No    Sexual activity: Not Currently     Other Topics Concern    Not on file     Social History Narrative    No narrative on file       Family History   Problem Relation Age of Onset    No Known Problems Family        No Known Allergies      Current Outpatient Prescriptions:     ferrous sulfate 324 (65 Fe) mg, Take 1 tablet (324 mg total) by mouth daily before breakfast (Patient not taking: Reported on 2018 ), Disp: 30 tablet, Rfl: 5    hydrochlorothiazide (MICROZIDE) 12 5 mg capsule, Take 1 capsule (12 5 mg total) by mouth daily (Patient not taking: Reported on 9/28/2018 ), Disp: 90 capsule, Rfl: 0    lisinopril (ZESTRIL) 10 mg tablet, Take 1 tablet (10 mg total) by mouth daily (Patient not taking: Reported on 9/28/2018 ), Disp: 90 tablet, Rfl: 0    Multiple Vitamins-Minerals (MULTIVITAMIN WITH MINERALS) tablet, Take by mouth daily, Disp: , Rfl:     pantoprazole (PROTONIX) 40 mg tablet, Take 1 tablet (40 mg total) by mouth daily (Patient not taking: Reported on 9/28/2018 ), Disp: 30 tablet, Rfl: 3      Physical Exam:  /70 (BP Location: Left arm, Patient Position: Sitting, Cuff Size: Standard)   Pulse 88   Temp 97 8 °F (36 6 °C) (Tympanic)   Resp 14   Wt 39 2 kg (86 lb 6 4 oz)   SpO2 98%   BMI 20 83 kg/m²     Physical Exam   Constitutional: She is oriented to person, place, and time  She appears well-developed and well-nourished  No distress  HENT:   Head: Normocephalic and atraumatic  Eyes: Conjunctivae are normal  No scleral icterus  Neck: Normal range of motion  Neck supple  Cardiovascular: Normal rate, regular rhythm and normal heart sounds  No murmur heard  Pulmonary/Chest: Effort normal and breath sounds normal  No respiratory distress  Abdominal: Soft  There is no tenderness  Musculoskeletal: Normal range of motion  She exhibits no edema or tenderness  Lymphadenopathy:     She has no cervical adenopathy  She has no axillary adenopathy  Right: No supraclavicular adenopathy present  Left: No supraclavicular adenopathy present  Neurological: She is alert and oriented to person, place, and time  She has normal strength  No cranial nerve deficit  Normal, equal strength of bilateral upper and lower extremities    Skin: Skin is warm and dry  Psychiatric: She has a normal mood and affect  Vitals reviewed          Labs:  Lab Results   Component Value Date    WBC 6 79 09/28/2018    HGB 12 3 09/28/2018    HCT 35 6 09/28/2018    MCV 87 09/28/2018     09/28/2018     Lab Results   Component Value Date     08/21/2018    K 3 8 08/21/2018     08/21/2018    CO2 26 08/21/2018    ANIONGAP 11 12/08/2015    BUN 26 (H) 08/21/2018    CREATININE 0 73 08/21/2018    GLUCOSE 94 12/08/2015    GLUF 92 07/23/2018    CALCIUM 9 1 08/21/2018    AST 29 08/21/2018    ALT 26 08/21/2018    ALKPHOS 52 08/21/2018    PROT 8 1 12/08/2015    BILITOT 0 51 12/08/2015    EGFR 75 08/21/2018       Patient voiced understanding and agreement in the above discussion  Aware to contact our office with questions/symptoms in the interim

## 2018-10-01 ENCOUNTER — TELEPHONE (OUTPATIENT)
Dept: HEMATOLOGY ONCOLOGY | Facility: CLINIC | Age: 83
End: 2018-10-01

## 2018-10-01 NOTE — TELEPHONE ENCOUNTER
I called and spook to the patient  I told her that her blood work showed normal hemoglobin and iron studies, according PA Leola Collins has instructed me

## 2018-10-01 NOTE — TELEPHONE ENCOUNTER
Please call patient's daughter Semaj Avilez to let her know her mother's blood work showed normal hemoglobin and iron studies  We will continue to monitor

## 2018-10-11 ENCOUNTER — TELEPHONE (OUTPATIENT)
Dept: GASTROENTEROLOGY | Facility: CLINIC | Age: 83
End: 2018-10-11

## 2018-10-11 ENCOUNTER — HOSPITAL ENCOUNTER (OUTPATIENT)
Dept: RADIOLOGY | Facility: HOSPITAL | Age: 83
Discharge: HOME | End: 2018-10-11
Attending: INTERNAL MEDICINE
Payer: MEDICARE

## 2018-10-11 DIAGNOSIS — D50.8 OTHER IRON DEFICIENCY ANEMIA: ICD-10-CM

## 2018-10-11 DIAGNOSIS — D50.8 OTHER IRON DEFICIENCY ANEMIA: Primary | ICD-10-CM

## 2018-10-11 PROCEDURE — 74022 RADEX COMPL AQT ABD SERIES: CPT

## 2018-10-11 PROCEDURE — 91010 ESOPHAGUS MOTILITY STUDY: CPT | Performed by: INTERNAL MEDICINE

## 2018-10-11 NOTE — TELEPHONE ENCOUNTER
----- Message from Melany Jonas MD sent at 10/11/2018 10:20 AM EDT -----  Please call patient's daughter to let her know that the capsule did not exit the stomach so will need xray to see if it has passed  If it hasn't then will need endoscopy to remove  I have spoken with her friend and instructed them to get the xray  Thanks  Please also schedule her for egd at [facility] in the next 1 - 2 weeks  Thanks

## 2018-10-11 NOTE — PROGRESS NOTES
Capsule study done but the capsule did not exit the stomach  Will get Xray to confirm passage of the capsule  If has not passed then will need EGD  Discussed with her friend

## 2018-10-11 NOTE — TELEPHONE ENCOUNTER
LUL, patient went for x-ray 10/11/18  Please call to schedule patient for EGD @ [facility] in the next 1-2 weeks per Dr Lashaun Yoo

## 2018-10-12 NOTE — TELEPHONE ENCOUNTER
Patient's daughter called back, she is concerned about the capsule not passing through  She is requesting to have her mother scheduled ASAP for her EGD  Daughter's name is Amanueldelgado Rosado, she can be reached at [phone]    Please advise

## 2018-10-16 ENCOUNTER — PREP FOR PROCEDURE (OUTPATIENT)
Dept: GASTROENTEROLOGY | Facility: CLINIC | Age: 83
End: 2018-10-16

## 2018-10-16 ENCOUNTER — TELEPHONE (OUTPATIENT)
Dept: GASTROENTEROLOGY | Facility: CLINIC | Age: 83
End: 2018-10-16

## 2018-10-16 DIAGNOSIS — K27.9 PUD (PEPTIC ULCER DISEASE): Primary | ICD-10-CM

## 2018-10-16 NOTE — TELEPHONE ENCOUNTER
Left message with daughter to schedule EGd 1in the next few weeks as per Dr Carmella Zavaleta reply, please look at additional telephone call

## 2018-10-16 NOTE — TELEPHONE ENCOUNTER
EGD scheduled with Dr Rey Nunez 11/2/18 at [facility] instructions mailed instructions

## 2018-10-16 NOTE — TELEPHONE ENCOUNTER
----- Message from Sri Agustin MD sent at 10/15/2018  5:25 PM EDT -----  Yes  But she needs a regular scheduled egd in the next couple of weeks  Thanks    ----- Message -----  From: Tere Hall MA  Sent: 10/15/2018  10:38 AM  To: Sri Agustin MD    I spoke to this patients daughter, Rush Abel, she stated she had spoken to you and the patyient had passed the capsule according to xray results   Is this right??

## 2018-10-22 ENCOUNTER — TELEPHONE (OUTPATIENT)
Dept: GASTROENTEROLOGY | Facility: HOSPITAL | Age: 83
End: 2018-10-22

## 2018-10-22 ENCOUNTER — TELEPHONE (OUTPATIENT)
Dept: GASTROENTEROLOGY | Facility: CLINIC | Age: 83
End: 2018-10-22

## 2018-10-22 NOTE — TELEPHONE ENCOUNTER
Dr Ortiz Done pt    Please return Bethany's call to r/s the pts EGD on 11/2  She would like to schedule a sooner appt if possible   [phone]

## 2018-10-22 NOTE — TELEPHONE ENCOUNTER
Mrs Milka Lal, patients friend who arranges appointments and transportation for her, called the office looking to reschedule her procedure due to moving to [place]  Called [facility], procedure , she will contact patients daughter, to make any further arrangements or changes regarding this  Per [facility] procedures for Eduardo go out into the new year

## 2018-10-24 NOTE — TELEPHONE ENCOUNTER
Jakub Van (daughter) called back   States she will keep procedure at [facility] on 11/2/2018

## 2018-11-02 ENCOUNTER — ANESTHESIA EVENT (OUTPATIENT)
Dept: PERIOP | Facility: HOSPITAL | Age: 83
End: 2018-11-02
Payer: MEDICARE

## 2018-11-02 ENCOUNTER — HOSPITAL ENCOUNTER (OUTPATIENT)
Facility: HOSPITAL | Age: 83
Setting detail: OUTPATIENT SURGERY
Discharge: HOME | End: 2018-11-02
Attending: INTERNAL MEDICINE | Admitting: INTERNAL MEDICINE
Payer: MEDICARE

## 2018-11-02 ENCOUNTER — ANESTHESIA (OUTPATIENT)
Dept: PERIOP | Facility: HOSPITAL | Age: 83
End: 2018-11-02
Payer: MEDICARE

## 2018-11-02 VITALS
SYSTOLIC BLOOD PRESSURE: 136 MMHG | HEART RATE: 71 BPM | TEMPERATURE: 97 F | OXYGEN SATURATION: 99 % | DIASTOLIC BLOOD PRESSURE: 72 MMHG | BODY MASS INDEX: 19.9 KG/M2 | HEIGHT: 55 IN | RESPIRATION RATE: 18 BRPM | WEIGHT: 86 LBS

## 2018-11-02 RX ORDER — SODIUM CHLORIDE, SODIUM LACTATE, POTASSIUM CHLORIDE, CALCIUM CHLORIDE 600; 310; 30; 20 MG/100ML; MG/100ML; MG/100ML; MG/100ML
125 INJECTION, SOLUTION INTRAVENOUS CONTINUOUS
Status: DISCONTINUED | OUTPATIENT
Start: 2018-11-02 | End: 2018-11-02 | Stop reason: HOSPADM

## 2018-11-02 RX ADMIN — SODIUM CHLORIDE, SODIUM LACTATE, POTASSIUM CHLORIDE, AND CALCIUM CHLORIDE 125 ML/HR: .6; .31; .03; .02 INJECTION, SOLUTION INTRAVENOUS at 11:36

## 2018-11-02 NOTE — ANESTHESIA PREPROCEDURE EVALUATION
Review of Systems/Medical History  Patient summary reviewed  Chart reviewed  No history of anesthetic complications     Cardiovascular  Hyperlipidemia, Hypertension , Valvular heart disease , aortic valve stenosis,   Comment: Mild AS, echo from 2014 shows EF 65%,  Pulmonary    Comment: Lung cancer     GI/Hepatic    PUD, GERD ,        Negative  ROS        Endo/Other  History of thyroid disease ,      GYN  Negative gynecology ROS          Hematology  Negative hematology ROS Anemia ,     Musculoskeletal  Negative musculoskeletal ROS        Neurology  Negative neurology ROS      Psychology     Dementia  Comment: Patient and friend state that she has multiple episodes of loss of consciousness where she becomes unresponsive for several minutes and then regains consciousness, most recently yesterday afternoon  She and her friend are unsure of the frequency of these episodes but know they have been happening more often recently  She has severe dementia and does not recall when she takes her medications, how often, and what dose  Her friend states that she is not taking them as prescribed  Physical Exam    Airway    Mallampati score: II  TM Distance: >3 FB  Neck ROM: full     Dental   No notable dental hx     Cardiovascular  Rhythm: regular, Rate: normal, Cardiovascular exam normal    Pulmonary  Pulmonary exam normal Breath sounds clear to auscultation,     Other Findings        Anesthesia Plan  ASA Score- 3     Anesthesia Type- IV sedation with anesthesia with ASA Monitors  Additional Monitors:   Airway Plan:     Comment: CASE CANCELLED  Plan Factors-    Induction-     Postoperative Plan-     Informed Consent- Anesthetic plan and risks discussed with patient (Here with friend)  I personally reviewed this patient with the CRNA  Discussed and agreed on the Anesthesia Plan with the KYLIE Persaud       Recent labs personally reviewed:  Lab Results   Component Value Date    WBC 6 79 09/28/2018    HGB 12 3 09/28/2018  09/28/2018     Lab Results   Component Value Date     (L) 12/08/2015    K 3 8 08/21/2018    BUN 26 (H) 08/21/2018    CREATININE 0 73 08/21/2018    GLUCOSE 94 12/08/2015     Lab Results   Component Value Date    PTT 29 08/08/2014      Lab Results   Component Value Date    INR 0 96 03/27/2018       Blood type A    Lab Results   Component Value Date    HGBA1C 5 0 12/08/2015       I, Richard Davies MD, have personally seen and evaluated the patient prior to anesthetic care  I have reviewed the pre-anesthetic record, and other medical records if appropriate to the anesthetic care  If a CRNA is involved in the case, I have reviewed the CRNA assessment, if present, and agree  Risks/benefits and alternatives discussed with patient including possible PONV, sore throat, and possibility of rare anesthetic and surgical emergencies

## 2018-11-07 ENCOUNTER — PREP FOR PROCEDURE (OUTPATIENT)
Dept: GASTROENTEROLOGY | Facility: MEDICAL CENTER | Age: 83
End: 2018-11-07

## 2018-11-07 ENCOUNTER — TELEPHONE (OUTPATIENT)
Dept: GASTROENTEROLOGY | Facility: MEDICAL CENTER | Age: 83
End: 2018-11-07

## 2018-11-07 DIAGNOSIS — K27.9 PUD (PEPTIC ULCER DISEASE): Primary | ICD-10-CM

## 2018-11-07 NOTE — TELEPHONE ENCOUNTER
Pt called to reschedule cancelled procedure  Pt is scheduled with dr Kunal Le on 12/11/18 at [facility] for an egd, she is aware she will get a call the day before with exact time of arrival

## 2018-11-07 NOTE — PATIENT INSTRUCTIONS
Pt is scheduled with dr Diana Sheth on 12/11/18 at [address] [place] [address] for an egd, she is aware she will get a call the day before with exact time of arrival

## 2018-12-10 ENCOUNTER — ANESTHESIA EVENT (OUTPATIENT)
Dept: PERIOP | Facility: HOSPITAL | Age: 83
End: 2018-12-10
Payer: MEDICARE

## 2018-12-11 ENCOUNTER — HOSPITAL ENCOUNTER (OUTPATIENT)
Facility: HOSPITAL | Age: 83
Setting detail: OUTPATIENT SURGERY
Discharge: HOME | End: 2018-12-11
Attending: INTERNAL MEDICINE | Admitting: INTERNAL MEDICINE
Payer: MEDICARE

## 2018-12-11 ENCOUNTER — ANESTHESIA (OUTPATIENT)
Dept: PERIOP | Facility: HOSPITAL | Age: 83
End: 2018-12-11
Payer: MEDICARE

## 2018-12-11 VITALS
RESPIRATION RATE: 18 BRPM | DIASTOLIC BLOOD PRESSURE: 65 MMHG | OXYGEN SATURATION: 99 % | HEART RATE: 72 BPM | BODY MASS INDEX: 19.9 KG/M2 | SYSTOLIC BLOOD PRESSURE: 137 MMHG | HEIGHT: 55 IN | WEIGHT: 86 LBS | TEMPERATURE: 97.4 F

## 2018-12-11 DIAGNOSIS — K27.9 PUD (PEPTIC ULCER DISEASE): ICD-10-CM

## 2018-12-11 PROCEDURE — 88305 TISSUE EXAM BY PATHOLOGIST: CPT | Performed by: PATHOLOGY

## 2018-12-11 PROCEDURE — 43239 EGD BIOPSY SINGLE/MULTIPLE: CPT | Performed by: INTERNAL MEDICINE

## 2018-12-11 PROCEDURE — 88313 SPECIAL STAINS GROUP 2: CPT | Performed by: PATHOLOGY

## 2018-12-11 RX ORDER — LIDOCAINE HYDROCHLORIDE 10 MG/ML
INJECTION, SOLUTION INFILTRATION; PERINEURAL AS NEEDED
Status: DISCONTINUED | OUTPATIENT
Start: 2018-12-11 | End: 2018-12-11 | Stop reason: SURG

## 2018-12-11 RX ORDER — PROPOFOL 10 MG/ML
INJECTION, EMULSION INTRAVENOUS AS NEEDED
Status: DISCONTINUED | OUTPATIENT
Start: 2018-12-11 | End: 2018-12-11 | Stop reason: SURG

## 2018-12-11 RX ORDER — SODIUM CHLORIDE, SODIUM LACTATE, POTASSIUM CHLORIDE, CALCIUM CHLORIDE 600; 310; 30; 20 MG/100ML; MG/100ML; MG/100ML; MG/100ML
125 INJECTION, SOLUTION INTRAVENOUS CONTINUOUS
Status: DISCONTINUED | OUTPATIENT
Start: 2018-12-11 | End: 2018-12-11

## 2018-12-11 RX ADMIN — PROPOFOL 50 MG: 10 INJECTION, EMULSION INTRAVENOUS at 12:41

## 2018-12-11 RX ADMIN — PROPOFOL 50 MG: 10 INJECTION, EMULSION INTRAVENOUS at 12:35

## 2018-12-11 RX ADMIN — PROPOFOL 50 MG: 10 INJECTION, EMULSION INTRAVENOUS at 12:38

## 2018-12-11 RX ADMIN — LIDOCAINE HYDROCHLORIDE 100 MG: 10 INJECTION, SOLUTION INFILTRATION; PERINEURAL at 12:35

## 2018-12-11 RX ADMIN — SODIUM CHLORIDE, SODIUM LACTATE, POTASSIUM CHLORIDE, AND CALCIUM CHLORIDE 125 ML/HR: .6; .31; .03; .02 INJECTION, SOLUTION INTRAVENOUS at 12:07

## 2018-12-11 NOTE — DISCHARGE INSTR - AVS FIRST PAGE
OPERATIVE REPORT  PATIENT NAME: Mike Lopez    :  1933  MRN: [id number]  Pt Location: [facility] OR ROOM 03    SURGERY DATE: 2018    Surgeon(s) and Role:     Victor M Galeas MD - Primary    Preop Diagnosis:  PUD (peptic ulcer disease) [K27 9]    Post-Op Diagnosis Codes:     * PUD (peptic ulcer disease) [K27 9]    Procedure(s) (LRB):  ESOPHAGOGASTRODUODENOSCOPY (EGD) (N/A)    Specimen(s):  ID Type Source Tests Collected by Time Destination   1 : GE junction biopsy,  retrieved by cold biopsy forceps Tissue Esophagogastric junction TISSUE EXAM Justin Garcia MD 2018 1240        Estimated Blood Loss:   Minimal    ESOPHAGOGASTRODUODENOSCOPY    PROCEDURE: EGD/ Biopsy    INDICATIONS: GERD and History of esophagitis  POST-OP DIAGNOSIS: See the impression below    SEDATION: Monitored anesthesia care, check anesthesia records    PHYSICAL EXAM:    Vitals:    18 1152   BP: 166/81   Pulse: 79   Resp: 18   Temp: (!) 95 7 °F (35 4 °C)   SpO2: 97%    Body mass index is 20 74 kg/m²  General: NAD  Heart: S1 & S2 normal, RRR  Lungs: CTA, No rales or rhonchi  Abdomen: Soft, nontender, nondistended, good bowel sounds    CONSENT:  Informed consent was obtained for the procedure, including sedation after explaining the risks and benefits of the procedure  Risks including but not limited to bleeding, perforation, infection, aspiration were discussed in detail  Also explained about less than 100% sensitivity with the exam and other alternatives  PREPARATION:   EKG tracing, pulse oximetry, blood pressure were monitored throughout the procedure  Patient was identified by myself both verbally and by visual inspection of ID band  DESCRIPTION:   Patient was placed in the left lateral decubitus position and was sedated with the above medication  The gastroscope was introduced in to the oropharynx and the esophagus was intubated under direct visualization   Scope was passed down the esophagus up to 2nd part of the duodenum  A careful inspection was made as the gastroscope was withdrawn, including a retroflexed view of the stomach; findings and interventions are described below  FINDINGS:    #1  Esophagus and GEJ- there was evidence of possible short-segment 1 cm tongue of Ramsay's esophagus at the distal esophagus  Biopsies were done  There was a erosion seen at the GE junction as well  The GE junction was at 33 cm  A 3 cm hiatal hernia was seen  #2  Stomach- the stomach appeared to be normal     #3  Duodenum- the duodenal bulb and the 2nd portion were normal          IMPRESSIONS:      1  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

## 2018-12-11 NOTE — ANESTHESIA PREPROCEDURE EVALUATION
Review of Systems/Medical History  Patient summary reviewed    History of anesthetic complications     Cardiovascular  EKG reviewed, Hyperlipidemia, Hypertension controlled,    Pulmonary    Comment: Lung cancer  GI/Hepatic            Endo/Other     GYN       Hematology  Anemia ,     Musculoskeletal       Neurology   Psychology           Physical Exam    Airway    Mallampati score: I  TM Distance: >3 FB  Neck ROM: full     Dental   upper dentures,     Cardiovascular  Rhythm: regular, Rate: normal,     Pulmonary  Breath sounds clear to auscultation,     Other Findings        Anesthesia Plan  ASA Score- 2     Anesthesia Type- IV sedation with anesthesia with ASA Monitors  Additional Monitors:   Airway Plan:         Plan Factors-    Induction-     Postoperative Plan-     Informed Consent- Anesthetic plan and risks discussed with patient and daughter  I personally reviewed this patient with the CRNA  Discussed and agreed on the Anesthesia Plan with the CRNA  Marco Valenzuela

## 2018-12-11 NOTE — OP NOTE
OPERATIVE REPORT  PATIENT NAME: Jenifer Nevarez    :  1933  MRN: [id number]  Pt Location: [facility] OR ROOM 03    SURGERY DATE: 2018    Surgeon(s) and Role:     Corine Larsen MD - Primary    Preop Diagnosis:  PUD (peptic ulcer disease) [K27 9]    Post-Op Diagnosis Codes:     * PUD (peptic ulcer disease) [K27 9]    Procedure(s) (LRB):  ESOPHAGOGASTRODUODENOSCOPY (EGD) (N/A)    Specimen(s):  ID Type Source Tests Collected by Time Destination   1 : GE junction biopsy,  retrieved by cold biopsy forceps Tissue Esophagogastric junction TISSUE EXAM Risa Ceballos MD 2018 1240        Estimated Blood Loss:   Minimal    ESOPHAGOGASTRODUODENOSCOPY    PROCEDURE: EGD/ Biopsy    INDICATIONS: GERD and History of esophagitis  POST-OP DIAGNOSIS: See the impression below    SEDATION: Monitored anesthesia care, check anesthesia records    PHYSICAL EXAM:    Vitals:    18 1152   BP: 166/81   Pulse: 79   Resp: 18   Temp: (!) 95 7 °F (35 4 °C)   SpO2: 97%    Body mass index is 20 74 kg/m²  General: NAD  Heart: S1 & S2 normal, RRR  Lungs: CTA, No rales or rhonchi  Abdomen: Soft, nontender, nondistended, good bowel sounds    CONSENT:  Informed consent was obtained for the procedure, including sedation after explaining the risks and benefits of the procedure  Risks including but not limited to bleeding, perforation, infection, aspiration were discussed in detail  Also explained about less than 100% sensitivity with the exam and other alternatives  PREPARATION:   EKG tracing, pulse oximetry, blood pressure were monitored throughout the procedure  Patient was identified by myself both verbally and by visual inspection of ID band  DESCRIPTION:   Patient was placed in the left lateral decubitus position and was sedated with the above medication  The gastroscope was introduced in to the oropharynx and the esophagus was intubated under direct visualization   Scope was passed down the esophagus up to 2nd part of the duodenum  A careful inspection was made as the gastroscope was withdrawn, including a retroflexed view of the stomach; findings and interventions are described below  FINDINGS:    #1  Esophagus and GEJ- there was evidence of possible short-segment 1 cm tongue of Ramsay's esophagus at the distal esophagus  Biopsies were done  There was a erosion seen at the GE junction as well  The GE junction was at 33 cm  A 3 cm hiatal hernia was seen  #2  Stomach- the stomach appeared to be normal     #3  Duodenum- the duodenal bulb and the 2nd portion were normal          IMPRESSIONS:      1  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

## 2018-12-11 NOTE — ANESTHESIA POSTPROCEDURE EVALUATION
Post-Op Assessment Note      CV Status:  Stable    Mental Status:  Somnolent    PONV Controlled:  None    Airway Patency:  Patent    Post Op Vitals Reviewed: Yes          Staff: CRNA           BP   95/53   Temp     Pulse  70   Resp   12   SpO2   100

## 2018-12-11 NOTE — H&P
History and Physical - [facility]  Paul Sheffieldeddie 80 y o  female MRN: [id number]    HPI: Brianna Villanueva is a 80y o  year old female who presents with history of esophagitis  Now presents for follow-up  Review of Systems    Historical Information   Past Medical History:   Diagnosis Date    Anemia     Cancer ([facility] [address] )     lung    HCVD (hypertensive cardiovascular disease)     History of stress test 2014    Using Rickey protocol    Hyperlipidemia     Hypertension     Hyponatremia     Hypotension, unspecified     Malignant neoplasm of unspecified part of unspecified bronchus or lung (HCC)     Nonrheumatic aortic (valve) stenosis     Small bowel obstruction ([facility] [address] )     last assessed 16     Past Surgical History:   Procedure Laterality Date    CATARACT EXTRACTION       SECTION      CHEST WALL BIOPSY N/A 2016    Procedure: Uterine Biopsy;  Surgeon: Dahiana Moody MD;  [facility];  Service:     EGD AND COLONOSCOPY N/A 3/30/2018    Procedure: EGD AND COLONOSCOPY;  Surgeon: Piper Lucas MD;  Location: [facility]; Service: Gastroenterology    LAPAROTOMY N/A 2016    Procedure: LAPAROTOMY EXPLORATORY, LYSIS OF Uterine ADHESIONS;  Surgeon: Tayla Grace DO;  [facility];  Service:     LUNG LOBECTOMY       Social History   History   Alcohol Use No     History   Drug Use No     History   Smoking Status    Never Smoker   Smokeless Tobacco    Never Used     Family History   Problem Relation Age of Onset    No Known Problems Family        Meds/Allergies     Prescriptions Prior to Admission   Medication    hydrochlorothiazide (MICROZIDE) 12 5 mg capsule    lisinopril (ZESTRIL) 10 mg tablet    Multiple Vitamins-Minerals (MULTIVITAMIN WITH MINERALS) tablet    pantoprazole (PROTONIX) 40 mg tablet    ferrous sulfate 324 (65 Fe) mg       No Known Allergies    Objective     There were no vitals taken for this visit        PHYSICAL EXAM    Gen: NAD  CV: RRR  CHEST: Clear  ABD: soft, NT/ND  EXT: no edema  Neuro: AAO      ASSESSMENT/PLAN:  This is a 80y o  year old female here for EGD for evaluation and follow-up esophagitis  PLAN:   Procedure:  EGD

## 2019-01-02 ENCOUNTER — TELEPHONE (OUTPATIENT)
Dept: HEMATOLOGY ONCOLOGY | Facility: CLINIC | Age: 84
End: 2019-01-02

## 2019-01-02 NOTE — TELEPHONE ENCOUNTER
Patient's daughter Semaj Avilez called cancelled appointment for 1/4/19, does not wish to  Reschedule will follow with PCP and now lives in [place] with daughter

## 2019-01-07 ENCOUNTER — TELEPHONE (OUTPATIENT)
Dept: GASTROENTEROLOGY | Facility: HOSPITAL | Age: 84
End: 2019-01-07

## 2019-01-07 NOTE — TELEPHONE ENCOUNTER
Lina's daughter contacted the office to check on Biopsy results from her previous procedure  Relayed the message left by the daughter and transferred phone call to Bela Darnell to help better answer further questions

## 2019-04-04 ENCOUNTER — OFFICE VISIT (OUTPATIENT)
Dept: CARDIOLOGY CLINIC | Facility: CLINIC | Age: 84
End: 2019-04-04
Payer: MEDICARE

## 2019-04-04 VITALS
HEART RATE: 86 BPM | BODY MASS INDEX: 20.83 KG/M2 | WEIGHT: 90 LBS | DIASTOLIC BLOOD PRESSURE: 80 MMHG | SYSTOLIC BLOOD PRESSURE: 128 MMHG | HEIGHT: 55 IN

## 2019-04-04 DIAGNOSIS — C34.90 ADENOCARCINOMA OF LUNG, UNSPECIFIED LATERALITY (HCC): ICD-10-CM

## 2019-04-04 DIAGNOSIS — F02.80 LATE ONSET ALZHEIMER'S DISEASE WITHOUT BEHAVIORAL DISTURBANCE (HCC): ICD-10-CM

## 2019-04-04 DIAGNOSIS — G30.1 LATE ONSET ALZHEIMER'S DISEASE WITHOUT BEHAVIORAL DISTURBANCE (HCC): ICD-10-CM

## 2019-04-04 DIAGNOSIS — I10 ESSENTIAL HYPERTENSION: Primary | ICD-10-CM

## 2019-04-04 PROCEDURE — 93000 ELECTROCARDIOGRAM COMPLETE: CPT | Performed by: INTERNAL MEDICINE

## 2019-04-04 PROCEDURE — 99214 OFFICE O/P EST MOD 30 MIN: CPT | Performed by: INTERNAL MEDICINE

## 2019-04-04 RX ORDER — ESCITALOPRAM OXALATE 10 MG/1
10 TABLET ORAL DAILY
COMMUNITY

## 2019-04-04 RX ORDER — MEMANTINE HYDROCHLORIDE 5 MG/1
5 TABLET ORAL DAILY
COMMUNITY

## 2019-08-16 ENCOUNTER — HOSPITAL ENCOUNTER (EMERGENCY)
Facility: HOSPITAL | Age: 84
Discharge: HOME | End: 2019-08-16
Attending: EMERGENCY MEDICINE | Admitting: EMERGENCY MEDICINE
Payer: MEDICARE

## 2019-08-16 VITALS
DIASTOLIC BLOOD PRESSURE: 90 MMHG | TEMPERATURE: 98.3 F | BODY MASS INDEX: 26.31 KG/M2 | SYSTOLIC BLOOD PRESSURE: 156 MMHG | HEART RATE: 93 BPM | RESPIRATION RATE: 18 BRPM | WEIGHT: 109.13 LBS | OXYGEN SATURATION: 98 %

## 2019-08-16 DIAGNOSIS — F03.90 DEMENTIA (HCC): Primary | ICD-10-CM

## 2019-08-16 PROCEDURE — 99284 EMERGENCY DEPT VISIT MOD MDM: CPT

## 2019-08-16 PROCEDURE — 99285 EMERGENCY DEPT VISIT HI MDM: CPT | Performed by: EMERGENCY MEDICINE

## 2019-08-16 NOTE — ED PROVIDER NOTES
History  Chief Complaint   Patient presents with    Altered Mental Status     Pt  brought to ER by EMS r/t neighbors finding her "wandering" by herself on the street where she lives  Neighbors tried to get her inside her home, but door was locked  Pt's  daughter on her way to pick her up      79 y/o female with past medical history significant for dementia presents today by EMS after being found outside by her neighbor  Neighbor tried to help her get back home and found to the door to be locked  Patient's daughter was called and is on her way here to pick the patient up  There is no injury  Patient has no complaints  History provided by:  Patient  Altered Mental Status   Presenting symptoms: disorientation    Severity:  Mild  Episode history:  Continuous  Timing:  Constant  Progression:  Unchanged  Chronicity:  Chronic  Context: dementia    Associated symptoms: no bladder incontinence, no difficulty breathing, no hallucinations, no headaches, no light-headedness, no seizures, no slurred speech and no weakness        Prior to Admission Medications   Prescriptions Last Dose Informant Patient Reported? Taking?    Multiple Vitamins-Minerals (MULTIVITAMIN WITH MINERALS) tablet  Self Yes No   Sig: Take by mouth daily   escitalopram (LEXAPRO) 10 mg tablet   Yes No   Sig: Take 10 mg by mouth daily   ferrous sulfate 324 (65 Fe) mg  Self No No   Sig: Take 1 tablet (324 mg total) by mouth daily before breakfast   Patient not taking: Reported on 4/4/2019   hydrochlorothiazide (MICROZIDE) 12 5 mg capsule  Self No No   Sig: Take 1 capsule (12 5 mg total) by mouth daily   Patient not taking: Reported on 4/4/2019   lisinopril (ZESTRIL) 10 mg tablet  Self No No   Sig: Take 1 tablet (10 mg total) by mouth daily   Patient not taking: Reported on 4/4/2019   memantine (NAMENDA) 5 mg tablet   Yes No   Sig: Take 5 mg by mouth daily   pantoprazole (PROTONIX) 40 mg tablet  Self No No   Sig: Take 1 tablet (40 mg total) by mouth daily      Facility-Administered Medications: None       Past Medical History:   Diagnosis Date    Anemia     Cancer ([facility] [address] )     lung    HCVD (hypertensive cardiovascular disease)     History of stress test 2014    Using Rickey protocol    Hyperlipidemia     Hypertension     Hyponatremia     Hypotension, unspecified     Malignant neoplasm of unspecified part of unspecified bronchus or lung ([facility] [address] )     Nonrheumatic aortic (valve) stenosis     [facility]     last assessed 16       Past Surgical History:   Procedure Laterality Date    CATARACT EXTRACTION       SECTION      CHEST WALL BIOPSY N/A 2016    Procedure: Uterine Biopsy;  Surgeon: Anika Pimentel MD;  Location:  MAIN OR;  Service:     EGD AND COLONOSCOPY N/A 3/30/2018    Procedure: EGD AND COLONOSCOPY;  Surgeon: Neeta Milner MD;  Location:  GI LAB; Service: Gastroenterology    ESOPHAGOGASTRODUODENOSCOPY N/A 2018    Procedure: ESOPHAGOGASTRODUODENOSCOPY (EGD); Surgeon: Rafaela Gaspar MD;  [facility]: [facility];  Service: Gastroenterology    LAPAROTOMY N/A 2016    Procedure: LAPAROTOMY EXPLORATORY, LYSIS OF Uterine ADHESIONS;  Surgeon: Lucio Daniel DO;  Location:  MAIN OR;  Service:     LUNG LOBECTOMY         Family History   Problem Relation Age of Onset    No Known Problems Family      I have reviewed and agree with the history as documented  Social History     Tobacco Use    Smoking status: Never Smoker    Smokeless tobacco: Never Used   Substance Use Topics    Alcohol use: No    Drug use: No        Review of Systems   Unable to perform ROS: Dementia   Genitourinary: Negative for bladder incontinence  Neurological: Negative for seizures, weakness, light-headedness and headaches  Psychiatric/Behavioral: Negative for hallucinations  Physical Exam  Physical Exam   Constitutional: She appears well-developed and well-nourished  HENT:   Head: Normocephalic and atraumatic  Mouth/Throat: Uvula is midline, oropharynx is clear and moist and mucous membranes are normal  No tonsillar exudate  Eyes: Pupils are equal, round, and reactive to light  Neck: Normal range of motion  Neck supple  Cardiovascular: Normal rate and regular rhythm  Pulmonary/Chest: Effort normal and breath sounds normal    Abdominal: Soft  Bowel sounds are normal  There is no tenderness  There is no rebound and no guarding  Musculoskeletal: She exhibits no edema, tenderness or deformity  Neurological: She is alert  Patient moving all extremities equally, no focal neuro deficits noted  Patient is alert awake and oriented to self and place  Is disoriented to time  Daughter is here with her and states this is her baseline  Skin: Skin is warm and dry  Psychiatric: She has a normal mood and affect  Nursing note and vitals reviewed  Vital Signs  ED Triage Vitals [08/16/19 1733]   Temperature Pulse Respirations Blood Pressure SpO2   98 3 °F (36 8 °C) 93 18 156/90 98 %      Temp Source Heart Rate Source Patient Position - Orthostatic VS BP Location FiO2 (%)   Oral -- -- -- --      Pain Score       No Pain           Vitals:    08/16/19 1733   BP: 156/90   Pulse: 93         Visual Acuity      ED Medications  Medications - No data to display    Diagnostic Studies  Results Reviewed     None                 No orders to display              Procedures  Procedures       ED Course                               MDM  Number of Diagnoses or Management Options  Dementia:   Diagnosis management comments: Daughter is here to pick patient up  Patient has no complaints at this time  Daughter has no concerns at this time  Patient is stable for discharge  Return to ED instructions reviewed         Amount and/or Complexity of Data Reviewed  Obtain history from someone other than the patient: yes  Review and summarize past medical records: yes    Risk of Complications, Morbidity, and/or Mortality  Presenting problems: low  Diagnostic procedures: low  Management options: low    Patient Progress  Patient progress: stable      Disposition  Final diagnoses:   Dementia     Time reflects when diagnosis was documented in both MDM as applicable and the Disposition within this note     Time User Action Codes Description Comment    8/16/2019  5:52 PM Susannah Schafer Layo [F03 [id number]] Dementia       ED Disposition     ED Disposition Condition Date/Time Comment    Discharge Stable Fri Aug 16, 2019  5:52  [address] discharge to home/self care              Follow-up Information     Follow up With Specialties Details Why Contact Info Nathaniel Griffin 13, DO Internal Medicine Schedule an appointment as soon as possible for a visit  As needed [phone] [facility] [phone]  [facility] Emergency Department Emergency Medicine  If symptoms worsen [address] [facility]  AN ED, [address], [place], [place], [address]          Discharge Medication List as of 8/16/2019  5:53 PM      CONTINUE these medications which have NOT CHANGED    Details   escitalopram (LEXAPRO) 10 mg tablet Take 10 mg by mouth daily, Historical Med      ferrous sulfate 324 (65 Fe) mg Take 1 tablet (324 mg total) by mouth daily before breakfast, Starting Mon 9/17/2018, Normal      hydrochlorothiazide (MICROZIDE) 12 5 mg capsule Take 1 capsule (12 5 mg total) by mouth daily, Starting Thu 7/19/2018, Normal      lisinopril (ZESTRIL) 10 mg tablet Take 1 tablet (10 mg total) by mouth daily, Starting Thu 7/19/2018, Normal      memantine (NAMENDA) 5 mg tablet Take 5 mg by mouth daily, Historical Med      Multiple Vitamins-Minerals (MULTIVITAMIN WITH MINERALS) tablet Take by mouth daily, Historical Med      pantoprazole (PROTONIX) 40 mg tablet Take 1 tablet (40 mg total) by mouth daily, Starting Tue 8/21/2018, Normal           No discharge procedures on file      ED Provider  Electronically Signed by           Chandana Painting DO  08/16/19 5540

## 2021-02-28 ENCOUNTER — IMMUNIZATIONS (OUTPATIENT)
Dept: FAMILY MEDICINE CLINIC | Facility: HOSPITAL | Age: 86
End: 2021-02-28

## 2021-02-28 DIAGNOSIS — Z23 ENCOUNTER FOR IMMUNIZATION: Primary | ICD-10-CM

## 2021-02-28 PROCEDURE — 0001A SARS-COV-2 / COVID-19 MRNA VACCINE (PFIZER-BIONTECH) 30 MCG: CPT

## 2021-02-28 PROCEDURE — 91300 SARS-COV-2 / COVID-19 MRNA VACCINE (PFIZER-BIONTECH) 30 MCG: CPT

## 2021-03-21 ENCOUNTER — IMMUNIZATIONS (OUTPATIENT)
Dept: FAMILY MEDICINE CLINIC | Facility: HOSPITAL | Age: 86
End: 2021-03-21

## 2021-03-21 DIAGNOSIS — Z23 ENCOUNTER FOR IMMUNIZATION: Primary | ICD-10-CM

## 2021-03-21 PROCEDURE — 91300 SARS-COV-2 / COVID-19 MRNA VACCINE (PFIZER-BIONTECH) 30 MCG: CPT

## 2021-03-21 PROCEDURE — 0002A SARS-COV-2 / COVID-19 MRNA VACCINE (PFIZER-BIONTECH) 30 MCG: CPT

## 2025-04-16 NOTE — PROGRESS NOTES
Hematology/Oncology Outpatient Follow-up  Cici Charles 80 y o  female [id number]    Date:  7/27/2018      Assessment and Plan:  1  Iron deficiency anemia due to chronic blood loss  Etiology of patient's anemia is likely multifactorial    She had EGD and colonoscopy in March 2018 which was negative  She also had imaging of her chest abdomen pelvis which was also negative  Occult stool test in June 2018 was negative  SPEP negative  Free light chain ratio 1 5   TSH normal, U13 and folic acid also normal  Sed rate 36  Hemoglobin has improved to 10 in June 2018 compared to 7 0 in April 2018  No intervention was implemented during this time except continuation of oral iron  Repeat labs at this time show hemoglobin 10 9  Iron panel showed iron saturation 11%, iron 37, ferritin 21  This is consistent with iron deficiency  Patient has not been taking any medications per patient's friend who accompanied her today  Therefore, plan for Feraheme as patient will not be compliant with oral therapy  She denies any black stool at this time  She likely has chronic blood loss of the GI tract  She has appointment with GI in August      Patient's friend More Lee ([phone]) was present at the encounter today  She states she has noticed that patient's dementia is worsening  She states patient continues to walk in town during the day and now also walking at night  Patient is not eating because she is not hungry  Her weight is improved/stable at the visit today  Patient's friend states patient was told by cardiology to d/c her medication  I reviewed note and confirmed this  No BP meds  I will contact her daughter regarding patient's current state  I believe intervention with [facility] is appropriate      - CBC and differential  - Iron Panel; Future  - Comprehensive metabolic panel    HPI:  80year old female with anemia   She [facility] to [facility] March 2018 with decreased hemoglobin and change in bowel movement  Patient underwent EGD and colonoscopy  This showed ulcerative esophagitis, erosive gastritis  She was advised to start PPI and anti reflux measures       Colonoscopy showed normal terminal ileum, normal colonic mucosa  Op note states that prep may have limited evaluation of small AVMs  No evidence of bleeding seen in the colon or ileum       Iron was assessed in 2018 -- 48  Repeat level in 2018 was 14       She was previously taking oral iron but advised to discontinue at office visit on 18       Occult stool test negative  SPEP negative  Free light chain ratio 1 5   TSH normal, D58 and folic acid also normal  Sed rate 36  Maxene Maw [phone]     Interval history: decreased appetite, worsening dementia per friend     ROS: Review of Systems   Constitutional: Positive for appetite change (decreased )  Negative for activity change, chills, fatigue, fever and unexpected weight change  HENT: Negative for mouth sores and nosebleeds  Respiratory: Negative for cough, chest tightness, shortness of breath and wheezing  Cardiovascular: Negative for chest pain, palpitations and leg swelling  Gastrointestinal: Negative for abdominal pain, blood in stool, constipation, diarrhea, nausea and vomiting  Decreased number of stools due to not eating much     Genitourinary: Negative for difficulty urinating, dysuria and hematuria  Musculoskeletal: Negative for arthralgias, joint swelling and myalgias  Skin: Negative  Neurological: Negative for dizziness, weakness, light-headedness, numbness and headaches  Hematological: Negative          Past Medical History:   Diagnosis Date    Cancer [facility])     lung    Hyperlipidemia     Hypertension     Hyponatremia     Small bowel obstruction ([facility] [address] )     last assessed 16       Past Surgical History:   Procedure Laterality Date    CATARACT EXTRACTION       SECTION      CHEST WALL BIOPSY N/A 2016    Procedure: Uterine Biopsy;  Surgeon: Steven Cristina MD;  [facility];  Service:     EGD AND COLONOSCOPY N/A 3/30/2018    Procedure: EGD AND COLONOSCOPY;  Surgeon: Teri Baxter MD;  [facility]; Service: Gastroenterology    LAPAROTOMY N/A 7/16/2016    Procedure: LAPAROTOMY EXPLORATORY, LYSIS OF Uterine ADHESIONS;  Surgeon: Pascual Sousa DO;  [facility];  Service:    Lanny Pritchard LUNG LOBECTOMY         Social History     Social History    Marital status:      Spouse name: N/A    Number of children: N/A    Years of education: N/A     Occupational History    retired      Social History Main Topics    Smoking status: Never Smoker    Smokeless tobacco: Never Used    Alcohol use No    Drug use: No    Sexual activity: Not Asked     Other Topics Concern    None     Social History Narrative    None       Family History   Problem Relation Age of Onset    No Known Problems Family        No Known Allergies      Current Outpatient Prescriptions:     ferrous sulfate 324 (65 Fe) mg, Take 1 tablet (324 mg total) by mouth 2 (two) times a day before meals, Disp: 60 tablet, Rfl: 1    hydrochlorothiazide (MICROZIDE) 12 5 mg capsule, Take 1 capsule (12 5 mg total) by mouth daily, Disp: 90 capsule, Rfl: 0    lisinopril (ZESTRIL) 10 mg tablet, Take 1 tablet (10 mg total) by mouth daily, Disp: 90 tablet, Rfl: 0      Physical Exam:  /78 (BP Location: Left arm, Cuff Size: Standard)   Pulse 78   Temp 98 1 °F (36 7 °C) (Tympanic)   Resp 16   Ht 4' 5" (1 346 m)   Wt 38 6 kg (85 lb)   SpO2 98%   BMI 21 28 kg/m²     Physical Exam   Constitutional: No distress  Underweight      HENT:   Head: Normocephalic and atraumatic  Eyes: Conjunctivae are normal  No scleral icterus  Neck: Normal range of motion  Neck supple  Cardiovascular: Normal rate, regular rhythm and normal heart sounds  No murmur heard  Pulmonary/Chest: Effort normal and breath sounds normal  No respiratory distress  Abdominal: Soft  There is no tenderness  Musculoskeletal: Normal range of motion  She exhibits no edema or tenderness  Lymphadenopathy:     She has no cervical adenopathy  Neurological: She is alert  No cranial nerve deficit  Skin: Skin is warm and dry  Labs:  Lab Results   Component Value Date    WBC 6 21 07/23/2018    HGB 10 9 (L) 07/23/2018    HCT 33 1 (L) 07/23/2018    MCV 85 07/23/2018     07/23/2018     Lab Results   Component Value Date     07/23/2018    K 3 6 07/23/2018     07/23/2018    CO2 26 07/23/2018    ANIONGAP 11 07/23/2018    BUN 21 07/23/2018    CREATININE 0 96 07/23/2018    GLUCOSE 89 03/28/2018    GLUF 92 07/23/2018    CALCIUM 9 1 07/23/2018    AST 29 07/23/2018    ALT 29 07/23/2018    ALKPHOS 46 07/23/2018    PROT 7 3 07/23/2018    BILITOT 0 20 07/23/2018    EGFR 54 07/23/2018       Patient voiced understanding and agreement in the above discussion  Aware to contact our office with questions/symptoms in the interim  today

## (undated) DEVICE — CONMED SCOPE SAVER BITE BLOCK, 20X27 MM: Brand: SCOPE SAVER

## (undated) DEVICE — THE EXACTO COLD SNARE IS INTENDED TO BE USED WITHOUT DIATHERMIC ENERGY FOR THE ENDOSCOPIC RESECTION OF POLYP TISSUE IN THE GASTROINTESTINAL TRACT.: Brand: EXACTO

## (undated) DEVICE — FORCEPS BIOPSY ALLIGATOR JAW W/NEEDLE 2.8MM

## (undated) DEVICE — LUBRICANT SURGILUBE TUBE 4 OZ  FLIP TOP

## (undated) DEVICE — TUBING SUCTION 5MM X 12 FT

## (undated) DEVICE — THE LARIAT SNARE IS AN ELECTROSURGICAL DEVICE USED TO ENDOSCOPICALLY GRASP, DISSECT, AND TRANSECT TISSUE DURING GASTROINTESTINAL (GI) ENDOSCOPIC PROCEDURES.  THE SNARE CAN BE USED WITH OR WITHOUT THE USE OF MONOPOLAR DIATHERMIC ENERGY.: Brand: LARIAT

## (undated) DEVICE — 2000CC GUARDIAN II: Brand: GUARDIAN